# Patient Record
Sex: FEMALE | Race: WHITE | Employment: FULL TIME | URBAN - METROPOLITAN AREA
[De-identification: names, ages, dates, MRNs, and addresses within clinical notes are randomized per-mention and may not be internally consistent; named-entity substitution may affect disease eponyms.]

---

## 2019-07-12 ENCOUNTER — TELEPHONE (OUTPATIENT)
Dept: SURGERY | Facility: CLINIC | Age: 54
End: 2019-07-12

## 2019-07-12 NOTE — TELEPHONE ENCOUNTER
Dr. Kelley Mathias called office to inform that patient is in house at [facility] in [place] with SBO. She is being managed conservatively at the moment but wanted Dr. Brian Rios to be aware.

## 2021-01-27 ENCOUNTER — OFFICE VISIT (OUTPATIENT)
Dept: SURGERY | Facility: CLINIC | Age: 56
End: 2021-01-27
Payer: COMMERCIAL

## 2021-01-27 VITALS
HEIGHT: 64 IN | SYSTOLIC BLOOD PRESSURE: 110 MMHG | HEART RATE: 114 BPM | OXYGEN SATURATION: 97 % | BODY MASS INDEX: 21.24 KG/M2 | WEIGHT: 124.38 LBS | DIASTOLIC BLOOD PRESSURE: 69 MMHG | RESPIRATION RATE: 20 BRPM

## 2021-01-27 DIAGNOSIS — C16.9 MALIGNANT NEOPLASM OF STOMACH, UNSPECIFIED LOCATION (HCC): Primary | ICD-10-CM

## 2021-01-27 DIAGNOSIS — Z01.818 PRE-OP TESTING: ICD-10-CM

## 2021-01-27 DIAGNOSIS — C78.6 PERITONEAL CARCINOMATOSIS (HCC): ICD-10-CM

## 2021-01-27 LAB
ALBUMIN SERPL-MCNC: 3.7 G/DL (ref 3.4–5)
ALBUMIN/GLOB SERPL: 1.1 {RATIO} (ref 1–2)
ALP LIVER SERPL-CCNC: 84 U/L
ALT SERPL-CCNC: 16 U/L
ANION GAP SERPL CALC-SCNC: 7 MMOL/L (ref 0–18)
AST SERPL-CCNC: 17 U/L (ref 15–37)
BASOPHILS # BLD AUTO: 0.02 X10(3) UL (ref 0–0.2)
BASOPHILS NFR BLD AUTO: 0.3 %
BILIRUB SERPL-MCNC: 0.8 MG/DL (ref 0.1–2)
BUN BLD-MCNC: 13 MG/DL (ref 7–18)
BUN/CREAT SERPL: 18.1 (ref 10–20)
CALCIUM BLD-MCNC: 8.6 MG/DL (ref 8.5–10.1)
CANCER AG19-9 SERPL-ACNC: 10 U/ML (ref ?–37)
CEA SERPL-MCNC: 4.4 NG/ML (ref ?–5)
CHLORIDE SERPL-SCNC: 108 MMOL/L (ref 98–112)
CO2 SERPL-SCNC: 24 MMOL/L (ref 21–32)
CREAT BLD-MCNC: 0.72 MG/DL
DEPRECATED RDW RBC AUTO: 41.1 FL (ref 35.1–46.3)
EOSINOPHIL # BLD AUTO: 0.06 X10(3) UL (ref 0–0.7)
EOSINOPHIL NFR BLD AUTO: 0.8 %
ERYTHROCYTE [DISTWIDTH] IN BLOOD BY AUTOMATED COUNT: 12.2 % (ref 11–15)
GLOBULIN PLAS-MCNC: 3.5 G/DL (ref 2.8–4.4)
GLUCOSE BLD-MCNC: 89 MG/DL (ref 70–99)
HCT VFR BLD AUTO: 36 %
HGB BLD-MCNC: 11.7 G/DL
IMM GRANULOCYTES # BLD AUTO: 0.02 X10(3) UL (ref 0–1)
IMM GRANULOCYTES NFR BLD: 0.3 %
LYMPHOCYTES # BLD AUTO: 1.11 X10(3) UL (ref 1–4)
LYMPHOCYTES NFR BLD AUTO: 13.9 %
M PROTEIN MFR SERPL ELPH: 7.2 G/DL (ref 6.4–8.2)
MCH RBC QN AUTO: 29.8 PG (ref 26–34)
MCHC RBC AUTO-ENTMCNC: 32.5 G/DL (ref 31–37)
MCV RBC AUTO: 91.8 FL
MONOCYTES # BLD AUTO: 0.64 X10(3) UL (ref 0.1–1)
MONOCYTES NFR BLD AUTO: 8 %
NEUTROPHILS # BLD AUTO: 6.12 X10 (3) UL (ref 1.5–7.7)
NEUTROPHILS # BLD AUTO: 6.12 X10(3) UL (ref 1.5–7.7)
NEUTROPHILS NFR BLD AUTO: 76.7 %
OSMOLALITY SERPL CALC.SUM OF ELEC: 288 MOSM/KG (ref 275–295)
PATIENT FASTING Y/N/NP: NO
PLATELET # BLD AUTO: 107 10(3)UL (ref 150–450)
POTASSIUM SERPL-SCNC: 4 MMOL/L (ref 3.5–5.1)
RBC # BLD AUTO: 3.92 X10(6)UL
SODIUM SERPL-SCNC: 139 MMOL/L (ref 136–145)
WBC # BLD AUTO: 8 X10(3) UL (ref 4–11)

## 2021-01-27 PROCEDURE — 82378 CARCINOEMBRYONIC ANTIGEN: CPT | Performed by: SURGERY

## 2021-01-27 PROCEDURE — 85025 COMPLETE CBC W/AUTO DIFF WBC: CPT | Performed by: SURGERY

## 2021-01-27 PROCEDURE — 86304 IMMUNOASSAY TUMOR CA 125: CPT | Performed by: SURGERY

## 2021-01-27 PROCEDURE — 99215 OFFICE O/P EST HI 40 MIN: CPT | Performed by: SURGERY

## 2021-01-27 PROCEDURE — 3008F BODY MASS INDEX DOCD: CPT | Performed by: SURGERY

## 2021-01-27 PROCEDURE — 86301 IMMUNOASSAY TUMOR CA 19-9: CPT | Performed by: SURGERY

## 2021-01-27 PROCEDURE — 3078F DIAST BP <80 MM HG: CPT | Performed by: SURGERY

## 2021-01-27 PROCEDURE — 3074F SYST BP LT 130 MM HG: CPT | Performed by: SURGERY

## 2021-01-27 PROCEDURE — 80053 COMPREHEN METABOLIC PANEL: CPT | Performed by: SURGERY

## 2021-01-27 NOTE — CONSULTS
[address] [facility] Surgical Oncology        Patient Name:  Pilar Lindquist   YOB: 1965   Gender:  Female   Appt Date:  1/27/2021   Provider:  Gurmeet Montoya MD     PATIENT PROVIDERS  Referring Provider: Janusz Campos MD    Primary Care Provider: Liv Palacios •  Fluticasone Propionate (FLONASE) 50 MCG/ACT Nasal Suspension, 2 sprays by Each Nare route as needed.   , Disp: , Rfl:      Allergies Reviewed:    Penicillins             UNKNOWN  Sulfa Antibiotics       NAUSEA AND VOMITING     History:  Reviewed:  Past M Neck: Neck: supple. Lymph Nodes: Lymph Nodes no cervical LAD, supraclavicular LAD, axillary LAD, or inguinal LAD. Lungs: Auscultation: breath sounds normal.   Cardiovascular: Heart Auscultation: RRR.    Abdomen: Inspection and Palpation: no masses, tend Patient had ample time to ask questions. Electronically Signed by:     Sharla Caballero.  Sabrina Ochoa MD FACS  [facility]  Professor of Surgery, [address] [facility]  [phone]

## 2021-01-27 NOTE — H&P (VIEW-ONLY)
[facility] Surgical On[facility]        Patient Name:  Marissa Hubbard   YOB: 1965   Gender:  Female   Appt Date:  1/27/2021   Provider:  Jocelyn Whitman MD     PATIENT PROVIDERS  Referring Provider: Simin William MD    Primary Care Provider: Ann-Marie Han Fluticasone Propionate (FLONASE) 50 MCG/ACT Nasal Suspension, 2 sprays by Each Nare route as needed.   , Disp: , Rfl:      Allergies Reviewed:    Penicillins             UNKNOWN  Sulfa Antibiotics       NAUSEA AND VOMITING     History:  Reviewed:  Past Medi Neck: supple. Lymph Nodes: Lymph Nodes no cervical LAD, supraclavicular LAD, axillary LAD, or inguinal LAD. Lungs: Auscultation: breath sounds normal.   Cardiovascular: Heart Auscultation: RRR.    Abdomen: Inspection and Palpation: no masses, tenderness to ask questions. Electronically Signed by:     Colten Mcclure MD FACS  Chief of Ruddy Uribe  Professor of Surgery, [address]  [phone]

## 2021-01-27 NOTE — PATIENT INSTRUCTIONS
Surgery:  Exploratory laparotomy, possible Cytoreductive surgery, possible multi-organ resection, hyperthermic intraperitoneal chemotherapy (HIPEC), possible jejunostomy tube    Date of Surgery:  2/12/21    Hospital:    [address] contact the prescribing provider for specific instructions on holding these medications for your procedure. · Inform your primary care physician of your surgery and ask if him/her will need to see you prior to surgery.   · If you develop symptoms of anothe

## 2021-01-28 DIAGNOSIS — C16.9 MALIGNANT NEOPLASM OF STOMACH, UNSPECIFIED LOCATION (HCC): Primary | ICD-10-CM

## 2021-01-28 DIAGNOSIS — C78.6 PERITONEAL CARCINOMATOSIS (HCC): ICD-10-CM

## 2021-01-28 LAB — CANCER AG125 SERPL-ACNC: 3.6 U/ML (ref ?–35)

## 2021-01-29 ENCOUNTER — TELEPHONE (OUTPATIENT)
Dept: HEMATOLOGY/ONCOLOGY | Facility: HOSPITAL | Age: 56
End: 2021-01-29

## 2021-01-29 RX ORDER — BUSPIRONE HYDROCHLORIDE 15 MG/1
7.5 TABLET ORAL 2 TIMES DAILY
Status: ON HOLD | COMMUNITY
End: 2021-11-15

## 2021-01-29 RX ORDER — ACETAMINOPHEN 500 MG
1000 TABLET ORAL ONCE
Status: CANCELLED | OUTPATIENT
Start: 2021-01-29 | End: 2021-01-29

## 2021-01-29 RX ORDER — FIBER
1 TABLET ORAL DAILY
COMMUNITY

## 2021-01-29 RX ORDER — FAMOTIDINE 20 MG/1
20 TABLET ORAL 2 TIMES DAILY
COMMUNITY

## 2021-02-11 ENCOUNTER — ANESTHESIA EVENT (OUTPATIENT)
Dept: SURGERY | Facility: HOSPITAL | Age: 56
DRG: 336 | End: 2021-02-11
Payer: COMMERCIAL

## 2021-02-12 ENCOUNTER — ANESTHESIA (OUTPATIENT)
Dept: SURGERY | Facility: HOSPITAL | Age: 56
DRG: 336 | End: 2021-02-12
Payer: COMMERCIAL

## 2021-02-12 ENCOUNTER — HOSPITAL ENCOUNTER (INPATIENT)
Facility: HOSPITAL | Age: 56
LOS: 5 days | Discharge: HOME HEALTH | DRG: 336 | End: 2021-02-17
Attending: SURGERY | Admitting: SURGERY
Payer: COMMERCIAL

## 2021-02-12 DIAGNOSIS — C78.6 PERITONEAL CARCINOMATOSIS (HCC): ICD-10-CM

## 2021-02-12 DIAGNOSIS — C16.9 MALIGNANT NEOPLASM OF STOMACH, UNSPECIFIED LOCATION (HCC): ICD-10-CM

## 2021-02-12 LAB
ANTIBODY SCREEN: NEGATIVE
GLUCOSE BLD-MCNC: 108 MG/DL (ref 70–99)
ISTAT BLOOD GAS BASE EXCESS: 1 MMOL/L (ref ?–30)
ISTAT BLOOD GAS HCO3: 25.8 MEQ/L (ref 22–26)
ISTAT BLOOD GAS O2 SATURATION: 100 % (ref 92–100)
ISTAT BLOOD GAS PCO2: 39.2 MMHG (ref 35–45)
ISTAT BLOOD GAS PH: 7.43 (ref 7.35–7.45)
ISTAT BLOOD GAS PO2: >400 MMHG (ref 80–105)
ISTAT BLOOD GAS TCO2: 27 MMOL/L (ref 22–32)
ISTAT HEMATOCRIT: 32 %
ISTAT IONIZED CALCIUM: 1.09 MMOL/L (ref 1.12–1.32)
ISTAT POTASSIUM: 3.9 MMOL/L (ref 3.6–5.1)
ISTAT SODIUM: 141 MMOL/L (ref 136–145)
RH BLOOD TYPE: POSITIVE

## 2021-02-12 PROCEDURE — 99252 IP/OBS CONSLTJ NEW/EST SF 35: CPT | Performed by: HOSPITALIST

## 2021-02-12 PROCEDURE — 3074F SYST BP LT 130 MM HG: CPT | Performed by: HOSPITALIST

## 2021-02-12 PROCEDURE — 3008F BODY MASS INDEX DOCD: CPT | Performed by: HOSPITALIST

## 2021-02-12 PROCEDURE — 0DNW0ZZ RELEASE PERITONEUM, OPEN APPROACH: ICD-10-PCS | Performed by: SURGERY

## 2021-02-12 PROCEDURE — 3078F DIAST BP <80 MM HG: CPT | Performed by: HOSPITALIST

## 2021-02-12 PROCEDURE — 76942 ECHO GUIDE FOR BIOPSY: CPT | Performed by: ANESTHESIOLOGY

## 2021-02-12 PROCEDURE — 05H533Z INSERTION OF INFUSION DEVICE INTO RIGHT SUBCLAVIAN VEIN, PERCUTANEOUS APPROACH: ICD-10-PCS | Performed by: SURGERY

## 2021-02-12 PROCEDURE — B546ZZA ULTRASONOGRAPHY OF RIGHT SUBCLAVIAN VEIN, GUIDANCE: ICD-10-PCS | Performed by: SURGERY

## 2021-02-12 PROCEDURE — 0DHA3UZ INSERTION OF FEEDING DEVICE INTO JEJUNUM, PERCUTANEOUS APPROACH: ICD-10-PCS | Performed by: SURGERY

## 2021-02-12 PROCEDURE — 0DJ08ZZ INSPECTION OF UPPER INTESTINAL TRACT, VIA NATURAL OR ARTIFICIAL OPENING ENDOSCOPIC: ICD-10-PCS | Performed by: INTERNAL MEDICINE

## 2021-02-12 DEVICE — BALLOON MIC JENUNAL 12FR: Type: IMPLANTABLE DEVICE | Site: ABDOMEN | Status: FUNCTIONAL

## 2021-02-12 RX ORDER — SODIUM CHLORIDE, SODIUM LACTATE, POTASSIUM CHLORIDE, CALCIUM CHLORIDE 600; 310; 30; 20 MG/100ML; MG/100ML; MG/100ML; MG/100ML
INJECTION, SOLUTION INTRAVENOUS CONTINUOUS
Status: DISCONTINUED | OUTPATIENT
Start: 2021-02-12 | End: 2021-02-13

## 2021-02-12 RX ORDER — FAMOTIDINE 10 MG/ML
20 INJECTION, SOLUTION INTRAVENOUS 2 TIMES DAILY
Status: DISCONTINUED | OUTPATIENT
Start: 2021-02-12 | End: 2021-02-17

## 2021-02-12 RX ORDER — MIDAZOLAM HYDROCHLORIDE 1 MG/ML
INJECTION INTRAMUSCULAR; INTRAVENOUS AS NEEDED
Status: DISCONTINUED | OUTPATIENT
Start: 2021-02-12 | End: 2021-02-12 | Stop reason: SURG

## 2021-02-12 RX ORDER — NEOSTIGMINE METHYLSULFATE 1 MG/ML
INJECTION INTRAVENOUS AS NEEDED
Status: DISCONTINUED | OUTPATIENT
Start: 2021-02-12 | End: 2021-02-12 | Stop reason: SURG

## 2021-02-12 RX ORDER — MEPERIDINE HYDROCHLORIDE 25 MG/ML
INJECTION INTRAMUSCULAR; INTRAVENOUS; SUBCUTANEOUS
Status: COMPLETED
Start: 2021-02-12 | End: 2021-02-12

## 2021-02-12 RX ORDER — SODIUM CHLORIDE, SODIUM LACTATE, POTASSIUM CHLORIDE, CALCIUM CHLORIDE 600; 310; 30; 20 MG/100ML; MG/100ML; MG/100ML; MG/100ML
INJECTION, SOLUTION INTRAVENOUS CONTINUOUS
Status: DISCONTINUED | OUTPATIENT
Start: 2021-02-12 | End: 2021-02-12 | Stop reason: HOSPADM

## 2021-02-12 RX ORDER — LABETALOL HYDROCHLORIDE 5 MG/ML
5 INJECTION, SOLUTION INTRAVENOUS EVERY 5 MIN PRN
Status: DISCONTINUED | OUTPATIENT
Start: 2021-02-12 | End: 2021-02-12 | Stop reason: HOSPADM

## 2021-02-12 RX ORDER — MIDAZOLAM HYDROCHLORIDE 1 MG/ML
1 INJECTION INTRAMUSCULAR; INTRAVENOUS EVERY 5 MIN PRN
Status: DISCONTINUED | OUTPATIENT
Start: 2021-02-12 | End: 2021-02-12 | Stop reason: HOSPADM

## 2021-02-12 RX ORDER — SODIUM CHLORIDE, SODIUM LACTATE, POTASSIUM CHLORIDE, CALCIUM CHLORIDE 600; 310; 30; 20 MG/100ML; MG/100ML; MG/100ML; MG/100ML
INJECTION, SOLUTION INTRAVENOUS CONTINUOUS PRN
Status: DISCONTINUED | OUTPATIENT
Start: 2021-02-12 | End: 2021-02-12 | Stop reason: SURG

## 2021-02-12 RX ORDER — SODIUM CHLORIDE 9 MG/ML
INJECTION, SOLUTION INTRAVENOUS CONTINUOUS
Status: DISCONTINUED | OUTPATIENT
Start: 2021-02-12 | End: 2021-02-14

## 2021-02-12 RX ORDER — ACETAMINOPHEN 10 MG/ML
1000 INJECTION, SOLUTION INTRAVENOUS EVERY 6 HOURS
Status: DISCONTINUED | OUTPATIENT
Start: 2021-02-12 | End: 2021-02-15

## 2021-02-12 RX ORDER — FAMOTIDINE 20 MG/1
20 TABLET ORAL 2 TIMES DAILY
Status: DISCONTINUED | OUTPATIENT
Start: 2021-02-12 | End: 2021-02-17

## 2021-02-12 RX ORDER — ENOXAPARIN SODIUM 100 MG/ML
40 INJECTION SUBCUTANEOUS DAILY
Status: DISCONTINUED | OUTPATIENT
Start: 2021-02-13 | End: 2021-02-17

## 2021-02-12 RX ORDER — NALOXONE HYDROCHLORIDE 0.4 MG/ML
80 INJECTION, SOLUTION INTRAMUSCULAR; INTRAVENOUS; SUBCUTANEOUS AS NEEDED
Status: DISCONTINUED | OUTPATIENT
Start: 2021-02-12 | End: 2021-02-12 | Stop reason: HOSPADM

## 2021-02-12 RX ORDER — GLYCOPYRROLATE 0.2 MG/ML
INJECTION, SOLUTION INTRAMUSCULAR; INTRAVENOUS AS NEEDED
Status: DISCONTINUED | OUTPATIENT
Start: 2021-02-12 | End: 2021-02-12 | Stop reason: SURG

## 2021-02-12 RX ORDER — ONDANSETRON 2 MG/ML
INJECTION INTRAMUSCULAR; INTRAVENOUS AS NEEDED
Status: DISCONTINUED | OUTPATIENT
Start: 2021-02-12 | End: 2021-02-12 | Stop reason: SURG

## 2021-02-12 RX ORDER — HEPARIN SODIUM 5000 [USP'U]/ML
5000 INJECTION, SOLUTION INTRAVENOUS; SUBCUTANEOUS ONCE
Status: COMPLETED | OUTPATIENT
Start: 2021-02-12 | End: 2021-02-12

## 2021-02-12 RX ORDER — LIDOCAINE HYDROCHLORIDE ANHYDROUS AND DEXTROSE MONOHYDRATE .8; 5 G/100ML; G/100ML
INJECTION, SOLUTION INTRAVENOUS CONTINUOUS PRN
Status: DISCONTINUED | OUTPATIENT
Start: 2021-02-12 | End: 2021-02-12 | Stop reason: SURG

## 2021-02-12 RX ORDER — ONDANSETRON 2 MG/ML
4 INJECTION INTRAMUSCULAR; INTRAVENOUS EVERY 6 HOURS PRN
Status: DISCONTINUED | OUTPATIENT
Start: 2021-02-12 | End: 2021-02-17

## 2021-02-12 RX ORDER — DEXAMETHASONE SODIUM PHOSPHATE 4 MG/ML
VIAL (ML) INJECTION AS NEEDED
Status: DISCONTINUED | OUTPATIENT
Start: 2021-02-12 | End: 2021-02-12 | Stop reason: SURG

## 2021-02-12 RX ORDER — LIDOCAINE HYDROCHLORIDE 10 MG/ML
INJECTION, SOLUTION EPIDURAL; INFILTRATION; INTRACAUDAL; PERINEURAL AS NEEDED
Status: DISCONTINUED | OUTPATIENT
Start: 2021-02-12 | End: 2021-02-12 | Stop reason: SURG

## 2021-02-12 RX ORDER — ONDANSETRON 2 MG/ML
4 INJECTION INTRAMUSCULAR; INTRAVENOUS AS NEEDED
Status: DISCONTINUED | OUTPATIENT
Start: 2021-02-12 | End: 2021-02-12 | Stop reason: HOSPADM

## 2021-02-12 RX ORDER — HYDROCODONE BITARTRATE AND ACETAMINOPHEN 5; 325 MG/1; MG/1
2 TABLET ORAL AS NEEDED
Status: DISCONTINUED | OUTPATIENT
Start: 2021-02-12 | End: 2021-02-12 | Stop reason: HOSPADM

## 2021-02-12 RX ORDER — KETOROLAC TROMETHAMINE 30 MG/ML
INJECTION, SOLUTION INTRAMUSCULAR; INTRAVENOUS AS NEEDED
Status: DISCONTINUED | OUTPATIENT
Start: 2021-02-12 | End: 2021-02-12 | Stop reason: SURG

## 2021-02-12 RX ORDER — HYDROCODONE BITARTRATE AND ACETAMINOPHEN 5; 325 MG/1; MG/1
1 TABLET ORAL AS NEEDED
Status: DISCONTINUED | OUTPATIENT
Start: 2021-02-12 | End: 2021-02-12 | Stop reason: HOSPADM

## 2021-02-12 RX ORDER — METRONIDAZOLE 500 MG/100ML
500 INJECTION, SOLUTION INTRAVENOUS ONCE
Status: COMPLETED | OUTPATIENT
Start: 2021-02-12 | End: 2021-02-12

## 2021-02-12 RX ORDER — HYDROMORPHONE HYDROCHLORIDE 1 MG/ML
0.4 INJECTION, SOLUTION INTRAMUSCULAR; INTRAVENOUS; SUBCUTANEOUS EVERY 5 MIN PRN
Status: DISCONTINUED | OUTPATIENT
Start: 2021-02-12 | End: 2021-02-12 | Stop reason: HOSPADM

## 2021-02-12 RX ORDER — HYDROMORPHONE HYDROCHLORIDE 1 MG/ML
INJECTION, SOLUTION INTRAMUSCULAR; INTRAVENOUS; SUBCUTANEOUS
Status: COMPLETED
Start: 2021-02-12 | End: 2021-02-12

## 2021-02-12 RX ORDER — ROCURONIUM BROMIDE 10 MG/ML
INJECTION, SOLUTION INTRAVENOUS AS NEEDED
Status: DISCONTINUED | OUTPATIENT
Start: 2021-02-12 | End: 2021-02-12 | Stop reason: SURG

## 2021-02-12 RX ORDER — MEPERIDINE HYDROCHLORIDE 25 MG/ML
12.5 INJECTION INTRAMUSCULAR; INTRAVENOUS; SUBCUTANEOUS AS NEEDED
Status: DISCONTINUED | OUTPATIENT
Start: 2021-02-12 | End: 2021-02-12 | Stop reason: HOSPADM

## 2021-02-12 RX ADMIN — MIDAZOLAM HYDROCHLORIDE 2 MG: 1 INJECTION INTRAMUSCULAR; INTRAVENOUS at 07:43:00

## 2021-02-12 RX ADMIN — SODIUM CHLORIDE, SODIUM LACTATE, POTASSIUM CHLORIDE, CALCIUM CHLORIDE: 600; 310; 30; 20 INJECTION, SOLUTION INTRAVENOUS at 11:23:00

## 2021-02-12 RX ADMIN — METRONIDAZOLE 500 MG: 500 INJECTION, SOLUTION INTRAVENOUS at 08:00:00

## 2021-02-12 RX ADMIN — LIDOCAINE HYDROCHLORIDE ANHYDROUS AND DEXTROSE MONOHYDRATE 2 MG/KG/HR: .8; 5 INJECTION, SOLUTION INTRAVENOUS at 07:40:00

## 2021-02-12 RX ADMIN — ONDANSETRON 4 MG: 2 INJECTION INTRAMUSCULAR; INTRAVENOUS at 10:49:00

## 2021-02-12 RX ADMIN — GLYCOPYRROLATE 0.6 MG: 0.2 INJECTION, SOLUTION INTRAMUSCULAR; INTRAVENOUS at 11:05:00

## 2021-02-12 RX ADMIN — SODIUM CHLORIDE, SODIUM LACTATE, POTASSIUM CHLORIDE, CALCIUM CHLORIDE: 600; 310; 30; 20 INJECTION, SOLUTION INTRAVENOUS at 09:40:00

## 2021-02-12 RX ADMIN — NEOSTIGMINE METHYLSULFATE 5 MG: 1 INJECTION INTRAVENOUS at 11:05:00

## 2021-02-12 RX ADMIN — ROCURONIUM BROMIDE 70 MG: 10 INJECTION, SOLUTION INTRAVENOUS at 07:50:00

## 2021-02-12 RX ADMIN — DEXAMETHASONE SODIUM PHOSPHATE 8 MG: 4 MG/ML VIAL (ML) INJECTION at 07:50:00

## 2021-02-12 RX ADMIN — LIDOCAINE HYDROCHLORIDE 50 MG: 10 INJECTION, SOLUTION EPIDURAL; INFILTRATION; INTRACAUDAL; PERINEURAL at 07:43:00

## 2021-02-12 RX ADMIN — SODIUM CHLORIDE, SODIUM LACTATE, POTASSIUM CHLORIDE, CALCIUM CHLORIDE: 600; 310; 30; 20 INJECTION, SOLUTION INTRAVENOUS at 07:37:00

## 2021-02-12 RX ADMIN — KETOROLAC TROMETHAMINE 30 MG: 30 INJECTION, SOLUTION INTRAMUSCULAR; INTRAVENOUS at 10:49:00

## 2021-02-12 NOTE — CONSULTS
[facility]                       Gastroenterology Consultation-[facility]    Vladimir Reveles Patient Status:  Inpatient    3/5/1965 MRN [id number]   [facility] 7NE-A Attending Reid Peoples MD   [facility] Intravenous, Continuous    •  [COMPLETED] Heparin Sodium (Porcine) 5000 UNIT/ML injection 5,000 Units, 5,000 Units, Subcutaneous, Once    •  [COMPLETED] metRONIDAZOLE in NaCl (FLAGYL) IVPB premix 500 mg, 500 mg, Intravenous, Once    •  ketamine (KETALAR) 2 disorders            Rheumatologic: The patient reports no history of chronic arthritis, myalgias, arthralgias            Genitourinary:  The patient reports no history of recurrent urinary tract infections, hematuria, dysuria, or nephrolithiasis -Patient with external compression of jejunum from recurrent peritoneal mets visualized on upper endoscopy during ex lap today  -Discussed with Dr. Hina Condon and Dr. Samia Mendosa, will plan for possible jejunal stenting in the near future; possibly Sunday vers

## 2021-02-12 NOTE — ANESTHESIA PROCEDURE NOTES
Peripheral IV  Inserted by: Sade Valadez MD    Placement  Needle gauge: 4Fr Powerwand   Laterality: left  Location: antecubital  Local anesthetic: none  Site prep: alcohol  Placement technique: 20g PIV placed x 1.  Changed over guidewire to Powerwand zachariah

## 2021-02-12 NOTE — CONSULTS
[facility]  CONSULT     Marston Carrel Patient Status:  Inpatient    3/5/1965 MRN [id number]   [facility] 7NE-A Attending Burgess Merissa MD   Hosp Day # 0 PCP Eneida Schwab MD     Reason for consult: Post-op    Requested drugs.     Family History:   Family History   Problem Relation Age of Onset   • Cancer Neg         Allergies:   Metoclopramide          OTHER (SEE COMMENTS)    Comment:agitation  Penicillins             UNKNOWN    Comment:As a child  Sulfa Antibiotics cyanosis. Integument: No rashes or lesions. Psychiatric: Appropriate mood and affect. Diagnostic Data:      Labs:  No results for input(s): WBC, HGB, MCV, PLT, BAND, INR in the last 168 hours.     Invalid input(s): LYM#, MONO#, BASOS#, EOSIN#    No

## 2021-02-12 NOTE — ANESTHESIA PREPROCEDURE EVALUATION
PRE-OP EVALUATION    Patient Name: Bladimir Jean Marie    Pre-op Diagnosis: Malignant neoplasm of stomach, unspecified location [facility]) [C16.9]  Peritoneal carcinomatosis ([facility] [address] [C78.6, C80.1]    Procedure(s):  Exploratory laparotomy, possible Cytoreductive (HIPEC) N/A 8/12/2016    Performed by Shalonda Pelaez MD at [facility] MAIN OR   • PORT, INDWELLING, IMP     • UPPER GI ENDOSCOPY,EXAM      x 3     Social History    Tobacco Use      Smoking status: Never Smoker      Smokeless tobacco: Never Used    Alcohol use: No

## 2021-02-12 NOTE — PLAN OF CARE
Assumed care @1330  VSS,   A&Ox4,   1L O2    NSR ,   Pain controlled with Dilaudid PCA, Ketamine gtt, scheduled tylenol. Bedrest.    Poor appetite. Urine output within parameters    Abdominal Incision c/d/I with dressings and abd binder in place.   J tube Maintains adequate nutritional intake (undernourished)  Description: INTERVENTIONS:  - Monitor percentage of each meal consumed  - Identify factors contributing to decreased intake, treat as appropriate  - Assist with meals as needed  - Monitor I&O, WT and

## 2021-02-12 NOTE — ANESTHESIA PROCEDURE NOTES
Airway  Urgency: elective      General Information and Staff    Patient location during procedure: OR  Anesthesiologist: Konstantin Hoskins MD  Performed: anesthesiologist     Indications and Patient Condition  Indications for airway management: anesthesia  Sed

## 2021-02-12 NOTE — CM/SW NOTE
Pt is a 64 yo female admitted for gastric cancer. Pt has surgery with Dr Nguyen Roles today. Pt lives near [place], [place]. She lives with her 3 children who are triplets - 2 boys and a girl. Pt was independent for her adls before the surgery.   Pt will need H

## 2021-02-12 NOTE — ANESTHESIA PROCEDURE NOTES
Arterial Line  Performed by: Xena Agudelo MD  Authorized by: Xena Agudelo MD     General Information and Staff    Procedure Start:   Anesthesiologist: Xena Agudelo MD  Performed By:  Anesthesiologist  Patient Location:  OR  Indication: continuous bloo

## 2021-02-12 NOTE — BRIEF OP NOTE
Pre-Operative Diagnosis: Malignant neoplasm of stomach, unspecified location ([facility] [address] [C16.9]  Peritoneal carcinomatosis ([facility] [address] [C78.6, C80.1]     Post-Operative Diagnosis: Malignant neoplasm of stomach, unspecified location ([facility] [address] [C16. 9]Peritoneal carcinomato

## 2021-02-12 NOTE — OPERATIVE REPORT
Operative Report-Esophagogastroduodenoscopy      PREOPERATIVE DIAGNOSIS/INDICATION: gastric cancer    POSTOPERTATIVE DIAGNOSIS: External compression of jejunal loop     PROCEDURE PERFORMED: EGD    INFORMED CONSENT: Once a brief his Kristen  2/12/2021  10:35 AM

## 2021-02-12 NOTE — ANESTHESIA PROCEDURE NOTES
Regional Block  Performed by: Ryland Simmons MD  Authorized by: Ryland Simmons MD       General Information and Staff    Start Time:   Anesthesiologist: Ryland Simmons MD  Performed by:   Anesthesiologist  Patient Location:  OR    Block Placement: Clau Velasquez

## 2021-02-12 NOTE — INTERVAL H&P NOTE
There has been no significant change since the patient was seen as documented in EPIC. EGD report reviewed. There was extrinsic compression just distal to the gastrojejunostomy anastomosis. Surgery revisted. To proceed as planned.       Radha Edwards MP

## 2021-02-12 NOTE — PROGRESS NOTES
02/12/21 1634   Clinical Encounter Type   Visited With Patient   Routine Visit Introduction   Continue Visiting No  (upon request or as needed)   Patient Spiritual Encounters   Spiritual Needs Patient presents spiritual strength   Spiritual Intervention

## 2021-02-12 NOTE — DIETARY NOTE
[facility]    NUTRITION ASSESSMENT    Pt does not meet malnutrition criteria. NUTRITION DIAGNOSIS/PROBLEM:    Altered GI function related to changes to GI tract function as evidenced by obstruction to esophagus    NUTRITION INTERVENTION:       1.  [place] SURENDRA per visual exam.    NUTRITION PRESCRIPTION:  Calories: 1841-2345 calories/day (30-35 calories per kg)  Protein: 66-83 grams protein/day (1.2-1.5 grams protein per kg)  Fluid: ~1 ml/kcal or per MD discretion    MONITOR AND EVALUATE/NUTRITION GOALS:    3

## 2021-02-13 LAB
ALBUMIN SERPL-MCNC: 2.5 G/DL (ref 3.4–5)
ALBUMIN/GLOB SERPL: 1 {RATIO} (ref 1–2)
ALP LIVER SERPL-CCNC: 59 U/L
ALT SERPL-CCNC: 37 U/L
ANION GAP SERPL CALC-SCNC: 4 MMOL/L (ref 0–18)
AST SERPL-CCNC: 54 U/L (ref 15–37)
BILIRUB SERPL-MCNC: 0.4 MG/DL (ref 0.1–2)
BUN BLD-MCNC: 12 MG/DL (ref 7–18)
BUN/CREAT SERPL: 18.8 (ref 10–20)
CALCIUM BLD-MCNC: 7.9 MG/DL (ref 8.5–10.1)
CHLORIDE SERPL-SCNC: 111 MMOL/L (ref 98–112)
CO2 SERPL-SCNC: 27 MMOL/L (ref 21–32)
CREAT BLD-MCNC: 0.64 MG/DL
DEPRECATED HBV CORE AB SER IA-ACNC: 123.6 NG/ML
DEPRECATED RDW RBC AUTO: 42.7 FL (ref 35.1–46.3)
ERYTHROCYTE [DISTWIDTH] IN BLOOD BY AUTOMATED COUNT: 12.5 % (ref 11–15)
GLOBULIN PLAS-MCNC: 2.6 G/DL (ref 2.8–4.4)
GLUCOSE BLD-MCNC: 102 MG/DL (ref 70–99)
GLUCOSE BLD-MCNC: 112 MG/DL (ref 70–99)
GLUCOSE BLD-MCNC: 118 MG/DL (ref 70–99)
GLUCOSE BLD-MCNC: 73 MG/DL (ref 70–99)
HAV IGM SER QL: 1.8 MG/DL (ref 1.6–2.6)
HCT VFR BLD AUTO: 26.9 %
HGB BLD-MCNC: 8.6 G/DL
HGB BLD-MCNC: 9.4 G/DL
IRON SATURATION: 8 %
IRON SERPL-MCNC: 21 UG/DL
M PROTEIN MFR SERPL ELPH: 5.1 G/DL (ref 6.4–8.2)
MCH RBC QN AUTO: 29.9 PG (ref 26–34)
MCHC RBC AUTO-ENTMCNC: 32 G/DL (ref 31–37)
MCV RBC AUTO: 93.4 FL
OSMOLALITY SERPL CALC.SUM OF ELEC: 292 MOSM/KG (ref 275–295)
PHOSPHATE SERPL-MCNC: 4.5 MG/DL (ref 2.5–4.9)
PLATELET # BLD AUTO: 103 10(3)UL (ref 150–450)
POTASSIUM SERPL-SCNC: 4.4 MMOL/L (ref 3.5–5.1)
RBC # BLD AUTO: 2.88 X10(6)UL
SODIUM SERPL-SCNC: 142 MMOL/L (ref 136–145)
TOTAL IRON BINDING CAPACITY: 264 UG/DL (ref 240–450)
TRANSFERRIN SERPL-MCNC: 177 MG/DL (ref 200–360)
WBC # BLD AUTO: 6.4 X10(3) UL (ref 4–11)

## 2021-02-13 PROCEDURE — 99233 SBSQ HOSP IP/OBS HIGH 50: CPT | Performed by: HOSPITALIST

## 2021-02-13 RX ORDER — MAGNESIUM SULFATE HEPTAHYDRATE 40 MG/ML
2 INJECTION, SOLUTION INTRAVENOUS ONCE
Status: COMPLETED | OUTPATIENT
Start: 2021-02-13 | End: 2021-02-13

## 2021-02-13 RX ORDER — DEXTROSE, SODIUM CHLORIDE, AND POTASSIUM CHLORIDE 5; .9; .15 G/100ML; G/100ML; G/100ML
INJECTION INTRAVENOUS CONTINUOUS
Status: DISCONTINUED | OUTPATIENT
Start: 2021-02-13 | End: 2021-02-15

## 2021-02-13 NOTE — OPERATIVE REPORT
[address]    PATIENT'S NAME: Will RODRIGUES   ATTENDING PHYSICIAN: Sharla Caballero. Sabrina Ochoa MD   OPERATING PHYSICIAN: hSarla Caballero.  Sabrina Ochoa MD   PATIENT ACCOUNT#:   [id number]    LOCATION:  [address] [place] [address]  MEDICAL RECORD #:   [id number]       DATE OF BIR midline incision extending to below the umbilicus was made and deepened. The fascia was incised. The peritoneal cavity was entered.   I encountered extensive adhesion from prior cytoreductive surgery that required a good portion of the operative time to l dressings were then applied. The patient tolerated the procedure well without immediate complications. She was extubated in the operating room and was sent in stable condition to recovery room. Counts were correct.   I was present throughout the procedur

## 2021-02-13 NOTE — PHYSICAL THERAPY NOTE
PHYSICAL THERAPY QUICK EVALUATION - INPATIENT    Room Number: 0565/9547-E  Evaluation Date: 2/13/2021  Presenting Problem: gastric cancer  Physician Order: PT Eval and Treat     2/12/21  PREOPERATIVE DIAGNOSIS:    1.        History of gastric carcinoma wi to Enter : 2  Railing: No  Stairs to Bedroom: 15  Railing: Yes    Lives With: Family  Drives: Yes  Patient Owned Equipment: None       Prior Level of [place]: per pt reports pt was I with ADL's and amb without assistive device.   Pt lives with her 3 16 CK      FUNCTIONAL ABILITY STATUS  Gait Assessment  Gait Assistance: Supervision  Distance (ft): 150  Assistive Device: Rolling walker;None  Pattern: Shuffle  Stoop/Curb Assistance: Supervision  Comment : pt went up/down 5 steps with one handrail    [employer] and overall evaluation complexity is considered low. PT Discharge Recommendations: Home    PLAN  Patient has been evaluated and presents with no skilled Physical Therapy needs at this time. Patient discharged from Physical Therapy services.   Please re-or

## 2021-02-13 NOTE — PLAN OF CARE
Assumed care @7952  VSS,   A&Ox4,   RA   NSR ,   Pain controlled with Dilaudid PCA, Ketamine gtt, scheduled tylenol. Up with 1 assist to chair. PT recommend home    Increased appetite. Lauren d/c'd. Adequate urine output.        Abdominal Incision c/d/I Evaluate fluid balance  Outcome: Progressing  Goal: Maintains adequate nutritional intake (undernourished)  Description: INTERVENTIONS:  - Monitor percentage of each meal consumed  - Identify factors contributing to decreased intake, treat as appropriate

## 2021-02-13 NOTE — PROGRESS NOTES
[facility]  Progress Note     Isha Fraga Patient Status:  Inpatient    3/5/1965 MRN [id number]   [facility] Attending Enedina Del Valle MD   Hosp Day # 1 PCP Bere Galindo MD     Chief Complaint: Gastric carcinoma 1. Gastric carcinoma with peritoneal carcinomatosis sp HIPEC (2016) sp exlap with DOUGLAS, jejunostomy tube placement; intraoperative ultrasound revealed external compression of jejunum d/t metastatic disease 2/12  1. Diet per surgery > NPO at midnight  2.

## 2021-02-13 NOTE — PROGRESS NOTES
Surgical Oncology Inpatient Progress Note    Subjective:  No acute events overnight. Patient stable and afebrile. On room air.   Urine output adequate      Objective:  Temp:  [97.8 °F (36.6 °C)-99.1 °F (37.3 °C)] 98.1 °F (36.7 °C)  Pulse:  [] 69  [email or web address]

## 2021-02-13 NOTE — PROGRESS NOTES
Assumed care at [address]. Patient A&Ox4. Tele SR, on room air. Pain controlled with current regimen. Midline abd incision with old bloody drainage. Abd binder in place. Jtube clamped. Lauren with adequate output. CLD, denies N/V. Hypoactive BS.  Denies pa

## 2021-02-14 ENCOUNTER — ANESTHESIA EVENT (OUTPATIENT)
Dept: ENDOSCOPY | Facility: HOSPITAL | Age: 56
DRG: 336 | End: 2021-02-14
Payer: COMMERCIAL

## 2021-02-14 ENCOUNTER — ANESTHESIA (OUTPATIENT)
Dept: ENDOSCOPY | Facility: HOSPITAL | Age: 56
DRG: 336 | End: 2021-02-14
Payer: COMMERCIAL

## 2021-02-14 ENCOUNTER — APPOINTMENT (OUTPATIENT)
Dept: GENERAL RADIOLOGY | Facility: HOSPITAL | Age: 56
DRG: 336 | End: 2021-02-14
Attending: SURGERY
Payer: COMMERCIAL

## 2021-02-14 LAB
ANION GAP SERPL CALC-SCNC: 5 MMOL/L (ref 0–18)
BASOPHILS # BLD AUTO: 0.01 X10(3) UL (ref 0–0.2)
BASOPHILS NFR BLD AUTO: 0.2 %
BUN BLD-MCNC: 13 MG/DL (ref 7–18)
BUN/CREAT SERPL: 27.7 (ref 10–20)
CALCIUM BLD-MCNC: 7.7 MG/DL (ref 8.5–10.1)
CHLORIDE SERPL-SCNC: 111 MMOL/L (ref 98–112)
CO2 SERPL-SCNC: 27 MMOL/L (ref 21–32)
CREAT BLD-MCNC: 0.47 MG/DL
DEPRECATED RDW RBC AUTO: 44.2 FL (ref 35.1–46.3)
EOSINOPHIL # BLD AUTO: 0.19 X10(3) UL (ref 0–0.7)
EOSINOPHIL NFR BLD AUTO: 3.2 %
ERYTHROCYTE [DISTWIDTH] IN BLOOD BY AUTOMATED COUNT: 12.7 % (ref 11–15)
GLUCOSE BLD-MCNC: 100 MG/DL (ref 70–99)
GLUCOSE BLD-MCNC: 108 MG/DL (ref 70–99)
GLUCOSE BLD-MCNC: 80 MG/DL (ref 70–99)
GLUCOSE BLD-MCNC: 87 MG/DL (ref 70–99)
HAV IGM SER QL: 2.1 MG/DL (ref 1.6–2.6)
HCT VFR BLD AUTO: 27.6 %
HGB BLD-MCNC: 8.7 G/DL
IMM GRANULOCYTES # BLD AUTO: 0.03 X10(3) UL (ref 0–1)
IMM GRANULOCYTES NFR BLD: 0.5 %
LYMPHOCYTES # BLD AUTO: 0.99 X10(3) UL (ref 1–4)
LYMPHOCYTES NFR BLD AUTO: 16.9 %
MCH RBC QN AUTO: 30 PG (ref 26–34)
MCHC RBC AUTO-ENTMCNC: 31.5 G/DL (ref 31–37)
MCV RBC AUTO: 95.2 FL
MONOCYTES # BLD AUTO: 0.34 X10(3) UL (ref 0.1–1)
MONOCYTES NFR BLD AUTO: 5.8 %
NEUTROPHILS # BLD AUTO: 4.31 X10 (3) UL (ref 1.5–7.7)
NEUTROPHILS # BLD AUTO: 4.31 X10(3) UL (ref 1.5–7.7)
NEUTROPHILS NFR BLD AUTO: 73.4 %
OSMOLALITY SERPL CALC.SUM OF ELEC: 295 MOSM/KG (ref 275–295)
PHOSPHATE SERPL-MCNC: 2.8 MG/DL (ref 2.5–4.9)
PLATELET # BLD AUTO: 93 10(3)UL (ref 150–450)
POTASSIUM SERPL-SCNC: 4.2 MMOL/L (ref 3.5–5.1)
RBC # BLD AUTO: 2.9 X10(6)UL
SODIUM SERPL-SCNC: 143 MMOL/L (ref 136–145)
WBC # BLD AUTO: 5.9 X10(3) UL (ref 4–11)

## 2021-02-14 PROCEDURE — 74328 X-RAY BILE DUCT ENDOSCOPY: CPT | Performed by: SURGERY

## 2021-02-14 PROCEDURE — 0D7A8DZ DILATION OF JEJUNUM WITH INTRALUMINAL DEVICE, VIA NATURAL OR ARTIFICIAL OPENING ENDOSCOPIC: ICD-10-PCS | Performed by: INTERNAL MEDICINE

## 2021-02-14 PROCEDURE — 99232 SBSQ HOSP IP/OBS MODERATE 35: CPT | Performed by: HOSPITALIST

## 2021-02-14 DEVICE — STENT SYSTEM WITH ANCHOR LOCK DELIVERY SYSTEM
Type: IMPLANTABLE DEVICE | Status: FUNCTIONAL
Brand: WALLFLEX™ DUODENAL

## 2021-02-14 RX ORDER — NALOXONE HYDROCHLORIDE 0.4 MG/ML
80 INJECTION, SOLUTION INTRAMUSCULAR; INTRAVENOUS; SUBCUTANEOUS AS NEEDED
Status: DISCONTINUED | OUTPATIENT
Start: 2021-02-14 | End: 2021-02-14 | Stop reason: HOSPADM

## 2021-02-14 RX ORDER — OXYCODONE HCL 5 MG/5 ML
5 SOLUTION, ORAL ORAL EVERY 4 HOURS PRN
Status: DISCONTINUED | OUTPATIENT
Start: 2021-02-14 | End: 2021-02-15

## 2021-02-14 RX ORDER — BUSPIRONE HYDROCHLORIDE 15 MG/1
7.5 TABLET ORAL 2 TIMES DAILY
Status: DISCONTINUED | OUTPATIENT
Start: 2021-02-14 | End: 2021-02-17

## 2021-02-14 RX ORDER — BUSPIRONE HYDROCHLORIDE 15 MG/1
7.5 TABLET ORAL 2 TIMES DAILY
Status: DISCONTINUED | OUTPATIENT
Start: 2021-02-14 | End: 2021-02-14

## 2021-02-14 RX ORDER — SODIUM CHLORIDE, SODIUM LACTATE, POTASSIUM CHLORIDE, CALCIUM CHLORIDE 600; 310; 30; 20 MG/100ML; MG/100ML; MG/100ML; MG/100ML
INJECTION, SOLUTION INTRAVENOUS CONTINUOUS
Status: DISCONTINUED | OUTPATIENT
Start: 2021-02-14 | End: 2021-02-14

## 2021-02-14 RX ORDER — LIDOCAINE HYDROCHLORIDE 10 MG/ML
INJECTION, SOLUTION EPIDURAL; INFILTRATION; INTRACAUDAL; PERINEURAL AS NEEDED
Status: DISCONTINUED | OUTPATIENT
Start: 2021-02-14 | End: 2021-02-14 | Stop reason: SURG

## 2021-02-14 RX ADMIN — LIDOCAINE HYDROCHLORIDE 50 MG: 10 INJECTION, SOLUTION EPIDURAL; INFILTRATION; INTRACAUDAL; PERINEURAL at 09:28:00

## 2021-02-14 NOTE — PROGRESS NOTES
Surgical Oncology Inpatient Progress Note    Subjective:  No acute events overnight. Patient stable and afebrile. Underwent upper endoscopy and stent placement per Dr Stefani Alexis.       Objective:  Temp:  [98 °F (36.7 °C)-98.5 °F (36.9 °C)] 98.5 °F (36.9 °C) team  Resume lovenox  Continue incentive spirometry and pulmonary toilet      Patrice Suarez MD  Complex General Surgical Oncology  [facility]  Pager [phone]  [email or web address]

## 2021-02-14 NOTE — PLAN OF CARE
Assumed care of patient at [address] [place] [address]. Patient is alert and oriented. Reports pain controlled with Ketamine and PCA. No complaints of nausea. Voiding without difficulty.  Ambulates with stand by assist.  Tube feeding stopped at midnight for EGD this am.    0600- hydration with IV or PO as ordered and tolerated  - Nasogastric tube to low intermittent suction as ordered  - Evaluate effectiveness of ordered antiemetic medications  - Provide nonpharmacologic comfort measures as appropriate  - Advance diet as tolerated

## 2021-02-14 NOTE — PROGRESS NOTES
[facility]  Progress Note     Nadyne Cooks Patient Status:  Inpatient    3/5/1965 MRN [id number]   [facility] 7NE-A Attending Jacqui Esparza MD   Hosp Day # 2 PCP Alejandra Ramsey MD     Chief Complaint: Gastric carcinoma famoTIDine  20 mg Intravenous BID   • acetaminophen  1,000 mg Intravenous Q6H       ASSESSMENT / PLAN:     1.  Gastric carcinoma with peritoneal carcinomatosis sp HIPEC (2016) sp exlap with DOUGLAS, jejunostomy tube placement; intraoperative ultrasound revealed

## 2021-02-14 NOTE — ANESTHESIA PREPROCEDURE EVALUATION
PRE-OP EVALUATION    Patient Name: Lonnie Frazier    Pre-op Diagnosis: Bowel obstruction ([facility] [address] [G15.852]    Procedure(s):  ESOPHAGOGASTRODUODENOSCOPY (EGD) WITH STENT    Surgeon(s) and Role:     Jose Rocha MD - Primary    Pre-op vitals rev 2/12/2021 at 0315    •  Multiple Vitamins-Minerals (MULTI-DAY PLUS MINERALS) Oral Tab, Take 1 tablet by mouth daily. , Disp: , Rfl: , 2/11/2021 at 0800    •  Biotin w/ Vitamins C & E (HAIR/SKIN/NAILS OR), Take 1 tablet by mouth daily.   , Disp: , Rfl: , 2/ Date    WBC 5.9 02/14/2021    RBC 2.90 (L) 02/14/2021    HGB 8.7 (L) 02/14/2021    HCT 27.6 (L) 02/14/2021    MCV 95.2 02/14/2021    MCH 30.0 02/14/2021    MCHC 31.5 02/14/2021    RDW 12.7 02/14/2021    PLT 93.0 (L) 02/14/2021     Lab Results   Component V

## 2021-02-14 NOTE — ANESTHESIA POSTPROCEDURE EVALUATION
[facility] Patient Status:  Inpatient   Age/Gender 63year old female MRN [id number]   Location [facility]. Attending Harpal Mukherjee MD   Hosp Day # 2 PCP Екатерина Howell MD       Anesthesia Post-op Note    Proced

## 2021-02-14 NOTE — OPERATIVE REPORT
Foy Babinski Patient Status:  Inpatient    3/5/1965 MRN [id number]   [facility] ENDOSCOPY Attending Onur Rai MD   Date 2021 PCP Germán Hernandez MD     PREOPERATIVE DIAGNOSIS/INDICATION: H/o gastric cancer, post gastrec the proximal small bowel, then through the scope over the guidewire a Deskidea enteral stent uncovered metal stent 22 mm x 6 cm, we deployed the stent bridging the stricture, placement was good, this was conformed fluoroscopically, no complication

## 2021-02-14 NOTE — PROGRESS NOTES
[facility] Gastroenterology     Julia Kerr Patient Status:  Inpatient    3/5/1965 MRN [id number]   [facility] 7NE-A Attending Richard Fiore MD   Hosp Day # 1 PCP Steve Ledesma MD COMMENTS)    Comment:agitation  Penicillins             UNKNOWN    Comment:As a child  Sulfa Antibiotics       NAUSEA AND VOMITING    Imaging:  I have personally reviewed all pertinent available imaging.        ASSESSMENT / PLAN:     Tamy Rand i

## 2021-02-15 PROBLEM — K91.89: Status: ACTIVE | Noted: 2021-02-15

## 2021-02-15 LAB
ALBUMIN SERPL-MCNC: 2.5 G/DL (ref 3.4–5)
ALP LIVER SERPL-CCNC: 91 U/L
ALT SERPL-CCNC: 26 U/L
ANION GAP SERPL CALC-SCNC: 4 MMOL/L (ref 0–18)
AST SERPL-CCNC: 26 U/L (ref 15–37)
BASOPHILS # BLD AUTO: 0.01 X10(3) UL (ref 0–0.2)
BASOPHILS NFR BLD AUTO: 0.2 %
BILIRUB DIRECT SERPL-MCNC: 0.1 MG/DL (ref 0–0.2)
BILIRUB SERPL-MCNC: 0.4 MG/DL (ref 0.1–2)
BUN BLD-MCNC: 9 MG/DL (ref 7–18)
BUN/CREAT SERPL: 16.4 (ref 10–20)
CALCIUM BLD-MCNC: 8.2 MG/DL (ref 8.5–10.1)
CHLORIDE SERPL-SCNC: 111 MMOL/L (ref 98–112)
CO2 SERPL-SCNC: 29 MMOL/L (ref 21–32)
CREAT BLD-MCNC: 0.55 MG/DL
DEPRECATED RDW RBC AUTO: 43.9 FL (ref 35.1–46.3)
EOSINOPHIL # BLD AUTO: 0.28 X10(3) UL (ref 0–0.7)
EOSINOPHIL NFR BLD AUTO: 4.6 %
ERYTHROCYTE [DISTWIDTH] IN BLOOD BY AUTOMATED COUNT: 12.7 % (ref 11–15)
GLUCOSE BLD-MCNC: 118 MG/DL (ref 70–99)
GLUCOSE BLD-MCNC: 119 MG/DL (ref 70–99)
GLUCOSE BLD-MCNC: 134 MG/DL (ref 70–99)
GLUCOSE BLD-MCNC: 136 MG/DL (ref 70–99)
GLUCOSE BLD-MCNC: 90 MG/DL (ref 70–99)
HAV IGM SER QL: 2 MG/DL (ref 1.6–2.6)
HCT VFR BLD AUTO: 27.7 %
HGB BLD-MCNC: 8.6 G/DL
IMM GRANULOCYTES # BLD AUTO: 0.01 X10(3) UL (ref 0–1)
IMM GRANULOCYTES NFR BLD: 0.2 %
LYMPHOCYTES # BLD AUTO: 0.83 X10(3) UL (ref 1–4)
LYMPHOCYTES NFR BLD AUTO: 13.7 %
M PROTEIN MFR SERPL ELPH: 5.4 G/DL (ref 6.4–8.2)
MCH RBC QN AUTO: 29.3 PG (ref 26–34)
MCHC RBC AUTO-ENTMCNC: 31 G/DL (ref 31–37)
MCV RBC AUTO: 94.2 FL
MONOCYTES # BLD AUTO: 0.3 X10(3) UL (ref 0.1–1)
MONOCYTES NFR BLD AUTO: 4.9 %
NEUTROPHILS # BLD AUTO: 4.64 X10 (3) UL (ref 1.5–7.7)
NEUTROPHILS # BLD AUTO: 4.64 X10(3) UL (ref 1.5–7.7)
NEUTROPHILS NFR BLD AUTO: 76.4 %
OSMOLALITY SERPL CALC.SUM OF ELEC: 296 MOSM/KG (ref 275–295)
PHOSPHATE SERPL-MCNC: 2.9 MG/DL (ref 2.5–4.9)
PLATELET # BLD AUTO: 89 10(3)UL (ref 150–450)
POTASSIUM SERPL-SCNC: 4.3 MMOL/L (ref 3.5–5.1)
RBC # BLD AUTO: 2.94 X10(6)UL
SODIUM SERPL-SCNC: 144 MMOL/L (ref 136–145)
WBC # BLD AUTO: 6.1 X10(3) UL (ref 4–11)

## 2021-02-15 PROCEDURE — 99231 SBSQ HOSP IP/OBS SF/LOW 25: CPT | Performed by: HOSPITALIST

## 2021-02-15 RX ORDER — HYDROMORPHONE HYDROCHLORIDE 1 MG/ML
0.2 INJECTION, SOLUTION INTRAMUSCULAR; INTRAVENOUS; SUBCUTANEOUS EVERY 2 HOUR PRN
Status: DISCONTINUED | OUTPATIENT
Start: 2021-02-15 | End: 2021-02-17

## 2021-02-15 RX ORDER — OXYCODONE HCL 5 MG/5 ML
5 SOLUTION, ORAL ORAL EVERY 4 HOURS PRN
Status: DISCONTINUED | OUTPATIENT
Start: 2021-02-15 | End: 2021-02-17

## 2021-02-15 RX ORDER — OXYCODONE HCL 5 MG/5 ML
5 SOLUTION, ORAL ORAL EVERY 6 HOURS PRN
Status: DISCONTINUED | OUTPATIENT
Start: 2021-02-15 | End: 2021-02-15

## 2021-02-15 RX ORDER — TEMAZEPAM 15 MG/1
15 CAPSULE ORAL NIGHTLY PRN
Status: DISCONTINUED | OUTPATIENT
Start: 2021-02-15 | End: 2021-02-17

## 2021-02-15 RX ORDER — ACETAMINOPHEN 160 MG/5ML
1000 SOLUTION ORAL EVERY 6 HOURS
Status: DISCONTINUED | OUTPATIENT
Start: 2021-02-15 | End: 2021-02-17

## 2021-02-15 NOTE — PROGRESS NOTES
Surgical Oncology Inpatient Progress Note    Subjective:  No acute events overnight. Patient stable and afebrile. Underwent upper endoscopy and stent placement per Dr Noy Ball yesterday.   Had some clear emesis with clears yesterday  + flatus   No nausea tube placement and intraoperative ultrasound. Doing well and progressing as anticipated. S/p upper endoscopy and stent placement per Dr. Mariann Millan: Continue clear liquid diet, continue tube feeds (cycle)  Discontinue PCA and Ketamine.  Start Tyl liqu

## 2021-02-15 NOTE — DIETARY NOTE
1230 General acute hospital ASSESSMENT    Pt does not meet malnutrition criteria.     NUTRITION DIAGNOSIS/PROBLEM:    Altered GI function related to changes to GI tract function as evidenced by obstruction to esophagus    NUTRITION INTERVENTION: HISTORY:  Appetite:   Intake: 25% (2/14)  Intake Meeting Needs: No  Food Allergies: No  Cultural/Ethnic/Mormonism Preferences Addresses: Yes    NUTRITION RELATED PHYSICAL FINDINGS:     1. Body Fat/Muscle Mass: SURENDRA per visual exam.     2. Fluid Accumulation

## 2021-02-15 NOTE — PLAN OF CARE
Assumed care 1900  Patient alert and oriented x4  Urine adequate per surg onc protocol  Pain controlled  Nausea with clear emesis x1  Relieved with zofran  Tolerating room air, NSR on tele  + gas -BM  Tube feeds running- tolerating- low residual volumes

## 2021-02-15 NOTE — CM/SW NOTE
MSW sent preliminary referral to [place] for suspected need for tube feedings. MSW spoke with Dietary as well. Orders not yet written, but tube feedings are anticipated.   Awaiting response from [place].  Patient unable to be secured with [facility] and no

## 2021-02-15 NOTE — PLAN OF CARE
Assumed care at 0730  A&Ox4  NSR on tele  C/o mild abdominal pain, relieved with PCA dilaudid and ketamine  Small episode of emesis this AM with clear liquids  TF infusing via J tube, plan to cycle them  lovenox education completed with pt, verbalized unde

## 2021-02-15 NOTE — PLAN OF CARE
Problem: Patient/Family Goals  Goal: Patient/Family Long Term Goal  Description: Patient's Long Term Goal: Return home    Interventions:  - have surgery  - pain control  - See additional Care Plan goals for specific interventions  Outcome: Progressing  G Evaluate fluid balance  Outcome: Progressing  Goal: Maintains adequate nutritional intake (undernourished)  Description: INTERVENTIONS:  - Monitor percentage of each meal consumed  - Identify factors contributing to decreased intake, treat as appropriate

## 2021-02-15 NOTE — PROGRESS NOTES
[facility]  Progress Note     Bianca Saul Patient Status:  Inpatient    3/5/1965 MRN [id number]   [facility] 7NE-A Attending Trang Harrison MD   Hosp Day # 3 PCP Roma Cabral MD     Chief Complaint: Gastric carcinoma Epic.    Medications:   • acetaminophen  1,000 mg Per J Tube Q6H   • busPIRone HCl  7.5 mg Oral BID   • Sertraline HCl  150 mg Oral Daily   • enoxaparin  40 mg Subcutaneous Daily   • famoTIDine  20 mg Oral BID    Or   • famoTIDine  20 mg Intravenous BID

## 2021-02-15 NOTE — PROGRESS NOTES
[address] [facility] Gastroenterology     Moody Kerr Patient Status:  Inpatient    3/5/1965 MRN [id number]   [facility] 7NE-A Attending Nilda Ozuna MD   Hosp Day # 3 PCP Jazzmine Alegre MD No results for input(s): PTP, INR in the last 168 hours. No data recorded    @[email or web address](      Imaging: Imaging data reviewed in Epic.     Medications:   • acetaminophen  1,000 mg Per J Tube Q6H   • busPIRone HCl  7.5 mg Oral BID   • Sertrali

## 2021-02-15 NOTE — PROGRESS NOTES
Assumed pt care @ 0730. EGD with enteral stent placement done today. IVF rate decreased down to 15cc/hr. TF resumed, clear liquid started. Per Dr. Nellie Edward, no meds via J-tube.    Pt not tolerating liquid well, gets nauseated, prn IV Zofran with

## 2021-02-16 ENCOUNTER — APPOINTMENT (OUTPATIENT)
Dept: GENERAL RADIOLOGY | Facility: HOSPITAL | Age: 56
DRG: 336 | End: 2021-02-16
Attending: PHYSICIAN ASSISTANT
Payer: COMMERCIAL

## 2021-02-16 LAB
ANION GAP SERPL CALC-SCNC: 5 MMOL/L (ref 0–18)
BASOPHILS # BLD AUTO: 0.01 X10(3) UL (ref 0–0.2)
BASOPHILS NFR BLD AUTO: 0.2 %
BLOOD TYPE BARCODE: 5100
BUN BLD-MCNC: 11 MG/DL (ref 7–18)
BUN/CREAT SERPL: 17.7 (ref 10–20)
CALCIUM BLD-MCNC: 8.6 MG/DL (ref 8.5–10.1)
CHLORIDE SERPL-SCNC: 109 MMOL/L (ref 98–112)
CO2 SERPL-SCNC: 28 MMOL/L (ref 21–32)
CREAT BLD-MCNC: 0.62 MG/DL
DEPRECATED RDW RBC AUTO: 43 FL (ref 35.1–46.3)
EOSINOPHIL # BLD AUTO: 0.24 X10(3) UL (ref 0–0.7)
EOSINOPHIL NFR BLD AUTO: 4.1 %
ERYTHROCYTE [DISTWIDTH] IN BLOOD BY AUTOMATED COUNT: 13 % (ref 11–15)
GLUCOSE BLD-MCNC: 115 MG/DL (ref 70–99)
GLUCOSE BLD-MCNC: 119 MG/DL (ref 70–99)
GLUCOSE BLD-MCNC: 126 MG/DL (ref 70–99)
GLUCOSE BLD-MCNC: 126 MG/DL (ref 70–99)
GLUCOSE BLD-MCNC: 132 MG/DL (ref 70–99)
HAV IGM SER QL: 2.1 MG/DL (ref 1.6–2.6)
HCT VFR BLD AUTO: 29.2 %
HGB BLD-MCNC: 9.5 G/DL
IMM GRANULOCYTES # BLD AUTO: 0.02 X10(3) UL (ref 0–1)
IMM GRANULOCYTES NFR BLD: 0.3 %
LYMPHOCYTES # BLD AUTO: 0.87 X10(3) UL (ref 1–4)
LYMPHOCYTES NFR BLD AUTO: 14.8 %
MCH RBC QN AUTO: 29.9 PG (ref 26–34)
MCHC RBC AUTO-ENTMCNC: 32.5 G/DL (ref 31–37)
MCV RBC AUTO: 91.8 FL
MONOCYTES # BLD AUTO: 0.45 X10(3) UL (ref 0.1–1)
MONOCYTES NFR BLD AUTO: 7.6 %
NEUTROPHILS # BLD AUTO: 4.3 X10 (3) UL (ref 1.5–7.7)
NEUTROPHILS # BLD AUTO: 4.3 X10(3) UL (ref 1.5–7.7)
NEUTROPHILS NFR BLD AUTO: 73 %
OSMOLALITY SERPL CALC.SUM OF ELEC: 295 MOSM/KG (ref 275–295)
PHOSPHATE SERPL-MCNC: 3.3 MG/DL (ref 2.5–4.9)
PLATELET # BLD AUTO: 116 10(3)UL (ref 150–450)
POTASSIUM SERPL-SCNC: 4.1 MMOL/L (ref 3.5–5.1)
RBC # BLD AUTO: 3.18 X10(6)UL
SODIUM SERPL-SCNC: 142 MMOL/L (ref 136–145)
WBC # BLD AUTO: 5.9 X10(3) UL (ref 4–11)

## 2021-02-16 PROCEDURE — 74240 X-RAY XM UPR GI TRC 1CNTRST: CPT | Performed by: PHYSICIAN ASSISTANT

## 2021-02-16 PROCEDURE — 99231 SBSQ HOSP IP/OBS SF/LOW 25: CPT | Performed by: HOSPITALIST

## 2021-02-16 NOTE — PROGRESS NOTES
Surgical Oncology Inpatient Progress Note    Subjective:  No acute events overnight. Patient stable and afebrile.   Had some clear emesis again yesterday, but tolerated water at night   + flatus, feels like she may have a BM  Pain controlled      Objective anticipated. S/p upper endoscopy and stent placement per Dr. Amando Guaman: Continue clear liquid diet, continue tube feeds (cycle), if tolerates clears may advance to full liquids   Tyl liquid and oxy liquid via j-tube.   Appreciate recommendations from

## 2021-02-16 NOTE — PROGRESS NOTES
[facility]  Progress Note     Tami Clarity Patient Status:  Inpatient    3/5/1965 MRN [id number]   [facility] 7NE-A Attending Oleg Kerr MD   Hosp Day # 4 PCP Dell Salazar MD     Chief Complaint: Gastric carcinoma input(s): PTP, INR in the last 168 hours. No results for input(s): TROP, CK in the last 168 hours. Imaging: Imaging data reviewed in Epic.     Medications:   • acetaminophen  1,000 mg Per J Tube Q6H   • busPIRone HCl  7.5 mg Oral BID   • Sertrali

## 2021-02-16 NOTE — PLAN OF CARE
Assumed care @0730  VSS,   A&Ox4,   RA   NSR ,   Pain controlled with scheduled tylenol. PRN oxy given x1  Up with standby assist.     Moderate urine output. Abdominal Incision c/d/I with dressings and abd binder in place.   Abd slightly distended/ga guidelines  Outcome: Progressing     Problem: GASTROINTESTINAL - ADULT  Goal: Minimal or absence of nausea and vomiting  Description: INTERVENTIONS:  - Maintain adequate hydration with IV or PO as ordered and tolerated  - Nasogastric tube to low intermitte

## 2021-02-16 NOTE — CM/SW NOTE
[facility] from [place] [phone] called to say that her nurse educator Bhavna Vásquez will come to the hospital today to teach pt how to manage her tube feedings at home. He will be here between 3:15-3:30pm.  Pt aware.   Sent enteral feeding order form to JOSE LUIS Asher

## 2021-02-16 NOTE — PLAN OF CARE
Assumed care 1900  Patient alert and oriented x4  Pain controlled with oxy PRN  Tube feeds running cyclic- increased per orders  Abdomen distended, denies nausea or abdominal bloating  + Gas - BM

## 2021-02-17 VITALS
WEIGHT: 125.19 LBS | HEIGHT: 64 IN | SYSTOLIC BLOOD PRESSURE: 90 MMHG | DIASTOLIC BLOOD PRESSURE: 61 MMHG | TEMPERATURE: 99 F | HEART RATE: 106 BPM | BODY MASS INDEX: 21.37 KG/M2 | OXYGEN SATURATION: 95 % | RESPIRATION RATE: 18 BRPM

## 2021-02-17 LAB
ANION GAP SERPL CALC-SCNC: 5 MMOL/L (ref 0–18)
BUN BLD-MCNC: 19 MG/DL (ref 7–18)
BUN/CREAT SERPL: 37.3 (ref 10–20)
CALCIUM BLD-MCNC: 8.2 MG/DL (ref 8.5–10.1)
CHLORIDE SERPL-SCNC: 109 MMOL/L (ref 98–112)
CO2 SERPL-SCNC: 27 MMOL/L (ref 21–32)
CREAT BLD-MCNC: 0.51 MG/DL
DEPRECATED RDW RBC AUTO: 44.8 FL (ref 35.1–46.3)
ERYTHROCYTE [DISTWIDTH] IN BLOOD BY AUTOMATED COUNT: 13 % (ref 11–15)
GLUCOSE BLD-MCNC: 121 MG/DL (ref 70–99)
GLUCOSE BLD-MCNC: 126 MG/DL (ref 70–99)
GLUCOSE BLD-MCNC: 87 MG/DL (ref 70–99)
HCT VFR BLD AUTO: 31.9 %
HGB BLD-MCNC: 10.1 G/DL
MCH RBC QN AUTO: 29.9 PG (ref 26–34)
MCHC RBC AUTO-ENTMCNC: 31.7 G/DL (ref 31–37)
MCV RBC AUTO: 94.4 FL
OSMOLALITY SERPL CALC.SUM OF ELEC: 296 MOSM/KG (ref 275–295)
PLATELET # BLD AUTO: 136 10(3)UL (ref 150–450)
POTASSIUM SERPL-SCNC: 4.7 MMOL/L (ref 3.5–5.1)
RBC # BLD AUTO: 3.38 X10(6)UL
SODIUM SERPL-SCNC: 141 MMOL/L (ref 136–145)
WBC # BLD AUTO: 5.6 X10(3) UL (ref 4–11)

## 2021-02-17 RX ORDER — OXYCODONE HCL 5 MG/5 ML
5 SOLUTION, ORAL ORAL EVERY 6 HOURS PRN
Qty: 120 ML | Refills: 0 | Status: SHIPPED | OUTPATIENT
Start: 2021-02-17 | End: 2022-02-01

## 2021-02-17 RX ORDER — ENOXAPARIN SODIUM 100 MG/ML
40 INJECTION SUBCUTANEOUS DAILY
Qty: 14 SYRINGE | Refills: 0 | Status: SHIPPED | OUTPATIENT
Start: 2021-02-17 | End: 2021-11-19

## 2021-02-17 RX ORDER — SERTRALINE HYDROCHLORIDE 20 MG/ML
150 SOLUTION ORAL DAILY
Qty: 225 ML | Refills: 0 | Status: SHIPPED | OUTPATIENT
Start: 2021-02-17 | End: 2021-03-19

## 2021-02-17 RX ORDER — ACETAMINOPHEN 160 MG/5ML
1000 SOLUTION ORAL EVERY 6 HOURS
Qty: 473 ML | Refills: 0 | Status: SHIPPED | OUTPATIENT
Start: 2021-02-17

## 2021-02-17 RX ORDER — SERTRALINE HYDROCHLORIDE 20 MG/ML
150 SOLUTION ORAL DAILY
Status: DISCONTINUED | OUTPATIENT
Start: 2021-02-17 | End: 2021-02-17

## 2021-02-17 NOTE — CM/SW NOTE
02/17/21 1300   Discharge disposition   Expected discharge disposition Home-Health   Name of Facillity/Home Care/Hospice   ([address])   Home services after discharge DME;Skilled home care   HME provider   ([facility] for tube feedings)   Discharge transportat

## 2021-02-17 NOTE — PLAN OF CARE
Assumed care at 1930  Abdominal pain managed with tylenol  Zofran given this morning, phlymy emesis this morning  Loose stool this morning as well  TF infusing, no residual  Abdominal incision c/d/i  Needs met will continue to monitor          Problem: SERENA

## 2021-02-17 NOTE — PROGRESS NOTES
Surgical Oncology Inpatient Progress Note    Subjective:  No acute events overnight. Patient stable and afebrile.   UGI reviewed, some narrowing but stent open   + flatus, + BM  Pain controlled      Objective:  Temp:  [97.9 °F (36.6 °C)-99.1 °F (37.3 °C)] progressing as anticipated.   S/p upper endoscopy and stent placement per Dr. Kosta Dale - UGI reviewed stent open, but still with narrowing needs more time to expand  Diet: Continue clear liquid diet, continue tube feeds (cycle), if tolerates clears may adva

## 2021-02-17 NOTE — PLAN OF CARE
Assumed care @0730  VSS,   A&Ox4,   RA   NSR ,   Pain controlled with scheduled tylenol. Up with standby assist.     Moderate urine output. Abdominal Incision c/d/I with dressings and abd binder in place. Abd slightly distended/gas. 1 BM loose. for Discharge     Problem: RISK FOR INFECTION - ADULT  Goal: Absence of fever/infection during anticipated neutropenic period  Description: INTERVENTIONS  - Monitor WBC  - Administer growth factors as ordered  - Implement neutropenic guidelines  Outcome: A wound care per orders  - Initiate isolation precautions as appropriate  - Initiate Pressure Ulcer prevention bundle as indicated  Outcome: Adequate for Discharge

## 2021-02-17 NOTE — CM/SW NOTE
Pt is ready for dc today. Regis already delivered the tube feedings to pt's room and son took them home. Located within [facility] [phone] accepted pt for RN/PT. They will see pt on Thursday.   Faxed clinical infor to [facility] at [phone] (they are not on Aidin)

## 2021-02-18 ENCOUNTER — TELEPHONE (OUTPATIENT)
Dept: SURGERY | Facility: CLINIC | Age: 56
End: 2021-02-18

## 2021-02-18 NOTE — TELEPHONE ENCOUNTER
Called to check on patient. She is doing well since being home and had a almas sleep. Pain controlled and denies fever or chills. Staples to be removed Friday 2/26. Tube feeds and HH have been appropriately arranged.  She will call us with an update of

## 2021-02-18 NOTE — DISCHARGE SUMMARY
[facility]  Discharge Summary    Sharon Walter Patient Status:  Inpatient    3/5/1965 MRN [id number]   [facility] 7NE-A Attending No att. providers found   Hosp Day # 5 PCP Florine Sandhoff, MD     Date of Admission: 2021 2/14/2021: EGD with enteral stent placement  PROCEDURE:  2/12/2021  1. Exploratory laparotomy with extensive lysis of adhesions. 2.       Jejunostomy tube placement. 3.       Intraoperative ultrasound.       Complications: None     Disposition: Marilu  2/18/2021  4:47 PM

## 2021-02-25 ENCOUNTER — TELEPHONE (OUTPATIENT)
Dept: HEMATOLOGY/ONCOLOGY | Facility: HOSPITAL | Age: 56
End: 2021-02-25

## 2021-03-03 ENCOUNTER — TELEPHONE (OUTPATIENT)
Dept: SURGERY | Facility: CLINIC | Age: 56
End: 2021-03-03

## 2021-03-03 NOTE — TELEPHONE ENCOUNTER
Spoke with patient's home health nurse who reports patient is down 6 lbs since discharge from the hospital. She is currently receiving tube feeds at goal and toelrating. She is tolerating minimal intake. No N/V. No diarrhea.  Discussed with Elzbieta Delarosa

## 2021-03-15 ENCOUNTER — TELEPHONE (OUTPATIENT)
Dept: SURGERY | Facility: CLINIC | Age: 56
End: 2021-03-15

## 2021-03-15 NOTE — TELEPHONE ENCOUNTER
Home health called and stated patient having hyper granulation tissue at j tube site with grayish colored drainage. Called patient and she currently stated she has not been using gauze for a barrier.   I informed her to use the split gauze between skin and

## 2021-03-17 ENCOUNTER — TELEPHONE (OUTPATIENT)
Dept: SURGERY | Facility: CLINIC | Age: 56
End: 2021-03-17

## 2021-03-17 NOTE — TELEPHONE ENCOUNTER
Patient sent photos of J tube site. Pt has some hyper granulation at site. Suggested to patient we would use silver nitrate at the site of hyper granulation.   Pt lives far away and I told her she could talk to her PCP about doing it or we could see her h

## 2021-03-26 NOTE — ANESTHESIA POSTPROCEDURE EVALUATION
[address] [facility] Patient Status:  Inpatient   Age/Gender 63year old female MRN [id number]   [facility] SURGERY Attending Natalie Hdz MD   Hosp Day # 0 PCP Julisa Desouza MD       Anesthesia Post-op Note    Procedur Discharged

## 2021-07-28 ENCOUNTER — LAB ENCOUNTER (OUTPATIENT)
Dept: LAB | Facility: HOSPITAL | Age: 56
End: 2021-07-28
Attending: INTERNAL MEDICINE
Payer: COMMERCIAL

## 2021-07-28 DIAGNOSIS — C80.1 CARCINOMA (HCC): Primary | ICD-10-CM

## 2021-07-29 PROCEDURE — 88360 TUMOR IMMUNOHISTOCHEM/MANUAL: CPT

## 2021-08-02 LAB — PERCENT OF CELLS/CIRCUMFEREN: 0

## 2021-11-15 ENCOUNTER — HOSPITAL ENCOUNTER (INPATIENT)
Facility: HOSPITAL | Age: 56
LOS: 4 days | Discharge: HOME HEALTH | DRG: 375 | End: 2021-11-19
Attending: SURGERY | Admitting: SURGERY
Payer: COMMERCIAL

## 2021-11-15 DIAGNOSIS — I82.A12 DVT OF LEFT AXILLARY VEIN, ACUTE (HCC): Primary | ICD-10-CM

## 2021-11-15 PROBLEM — C16.9 GASTRIC CARCINOMA (HCC): Status: ACTIVE | Noted: 2021-11-15

## 2021-11-15 PROCEDURE — 99222 1ST HOSP IP/OBS MODERATE 55: CPT | Performed by: SURGERY

## 2021-11-15 PROCEDURE — 99253 IP/OBS CNSLTJ NEW/EST LOW 45: CPT | Performed by: INTERNAL MEDICINE

## 2021-11-15 RX ORDER — OXYCODONE HCL 5 MG/5 ML
5 SOLUTION, ORAL ORAL EVERY 6 HOURS PRN
Status: DISCONTINUED | OUTPATIENT
Start: 2021-11-15 | End: 2021-11-19

## 2021-11-15 RX ORDER — ONDANSETRON 2 MG/ML
4 INJECTION INTRAMUSCULAR; INTRAVENOUS EVERY 6 HOURS PRN
Status: DISCONTINUED | OUTPATIENT
Start: 2021-11-15 | End: 2021-11-16

## 2021-11-15 RX ORDER — BUSPIRONE HYDROCHLORIDE 15 MG/1
7.5 TABLET ORAL 2 TIMES DAILY
Status: DISCONTINUED | OUTPATIENT
Start: 2021-11-15 | End: 2021-11-15

## 2021-11-15 RX ORDER — ONDANSETRON HYDROCHLORIDE 4 MG/5ML
4 SOLUTION ORAL AS NEEDED
COMMUNITY

## 2021-11-15 RX ORDER — LORAZEPAM 2 MG/ML
0.4 CONCENTRATE ORAL EVERY 8 HOURS PRN
COMMUNITY

## 2021-11-15 RX ORDER — ACETAMINOPHEN 325 MG/1
650 TABLET ORAL EVERY 6 HOURS PRN
Status: DISCONTINUED | OUTPATIENT
Start: 2021-11-15 | End: 2021-11-19

## 2021-11-15 RX ORDER — SODIUM CHLORIDE, SODIUM LACTATE, POTASSIUM CHLORIDE, CALCIUM CHLORIDE 600; 310; 30; 20 MG/100ML; MG/100ML; MG/100ML; MG/100ML
INJECTION, SOLUTION INTRAVENOUS CONTINUOUS
Status: DISCONTINUED | OUTPATIENT
Start: 2021-11-15 | End: 2021-11-19

## 2021-11-15 RX ORDER — MELATONIN
3 NIGHTLY PRN
Status: DISCONTINUED | OUTPATIENT
Start: 2021-11-15 | End: 2021-11-19

## 2021-11-15 RX ORDER — SERTRALINE HYDROCHLORIDE 20 MG/ML
150 SOLUTION ORAL DAILY
COMMUNITY

## 2021-11-15 RX ORDER — LORAZEPAM 2 MG/ML
0.4 CONCENTRATE ORAL EVERY 8 HOURS PRN
Status: DISCONTINUED | OUTPATIENT
Start: 2021-11-15 | End: 2021-11-19

## 2021-11-15 RX ORDER — PROCHLORPERAZINE EDISYLATE 5 MG/ML
5 INJECTION INTRAMUSCULAR; INTRAVENOUS EVERY 8 HOURS PRN
Status: DISCONTINUED | OUTPATIENT
Start: 2021-11-15 | End: 2021-11-16

## 2021-11-15 RX ORDER — FLUTICASONE PROPIONATE 50 MCG
2 SPRAY, SUSPENSION (ML) NASAL
Status: DISCONTINUED | OUTPATIENT
Start: 2021-11-15 | End: 2021-11-19

## 2021-11-15 RX ORDER — TEMAZEPAM 15 MG/1
15 CAPSULE ORAL NIGHTLY PRN
Status: DISCONTINUED | OUTPATIENT
Start: 2021-11-15 | End: 2021-11-19

## 2021-11-15 NOTE — CONSULTS
[facility] HOSPITALIST  CONSULT     Teresacaro Terri Patient Status:  Inpatient    3/5/1965 MRN [id number]   [facility] 7NE-A Attending Philip Rice MD   Hosp Day # 0 PCP Glenis Guzman MD     Reason for consult: Medical management • UPPER GI ENDOSCOPY,EXAM      x 3     Social History:  reports that she has never smoked. She has never used smokeless tobacco. She reports that she does not drink alcohol and does not use drugs.     Family History:   Family History   Problem Relation Ag 104/69 (BP Location: Left arm)   Temp 98.5 °F (36.9 °C) (Oral)   Resp 18   LMP 04/15/2016   SpO2 97%   General: No acute distress. Alert and oriented x 3. HEENT: Normocephalic atraumatic. Moist mucous membranes. EOM-I. PERRLA. Anicteric.   Neck: Trachea mi and on Buspar      Quality:  · DVT Prophylaxis: SCDs  · CODE status: Full  · Lauren: No    Plan of care discussed with patient, RN    Sang Awad,   11/15/2021

## 2021-11-15 NOTE — H&P
[address] [facility] Surgical Oncology        Patient Name:  Jg Rogers   YOB: 1965   Gender:  Female   Appt Date:  11/9/2021   Provider:  General Tony THOMAS         CHIEF COMPLAINT  Met Gastric cancer     PROBLEMS  Reviewed Patient Activ Personal history of antineoplastic chemotherapy     last chemo: 2020   • Tinnitus    • Visual impairment     glasses      Reviewed:  Past Surgical History:   Procedure Laterality Date   •      • Cholecystectomy     • Colonoscopy      x 2   • La do not have outside imaging for my review as patient did not bring. It seems that patient has progression of disease even her nausea/vomiting despite previously placed enteral stent.    Dr. Hu Raymundo aware and patient admitted for EGD/stent evaluation/re-s

## 2021-11-16 ENCOUNTER — APPOINTMENT (OUTPATIENT)
Dept: GENERAL RADIOLOGY | Facility: HOSPITAL | Age: 56
DRG: 375 | End: 2021-11-16
Attending: INTERNAL MEDICINE
Payer: COMMERCIAL

## 2021-11-16 ENCOUNTER — ANESTHESIA EVENT (OUTPATIENT)
Dept: ENDOSCOPY | Facility: HOSPITAL | Age: 56
DRG: 375 | End: 2021-11-16
Payer: COMMERCIAL

## 2021-11-16 ENCOUNTER — ANESTHESIA (OUTPATIENT)
Dept: ENDOSCOPY | Facility: HOSPITAL | Age: 56
DRG: 375 | End: 2021-11-16
Payer: COMMERCIAL

## 2021-11-16 PROCEDURE — 74360 X-RAY GUIDE GI DILATION: CPT | Performed by: INTERNAL MEDICINE

## 2021-11-16 PROCEDURE — 99231 SBSQ HOSP IP/OBS SF/LOW 25: CPT | Performed by: SURGERY

## 2021-11-16 PROCEDURE — 0D768DZ DILATION OF STOMACH WITH INTRALUMINAL DEVICE, VIA NATURAL OR ARTIFICIAL OPENING ENDOSCOPIC: ICD-10-PCS | Performed by: INTERNAL MEDICINE

## 2021-11-16 PROCEDURE — 99232 SBSQ HOSP IP/OBS MODERATE 35: CPT | Performed by: HOSPITALIST

## 2021-11-16 DEVICE — STENT SYSTEM WITH ANCHOR LOCK DELIVERY SYSTEM
Type: IMPLANTABLE DEVICE | Site: DUODENAL | Status: FUNCTIONAL
Brand: WALLFLEX™ DUODENAL

## 2021-11-16 RX ORDER — MEPERIDINE HYDROCHLORIDE 25 MG/ML
12.5 INJECTION INTRAMUSCULAR; INTRAVENOUS; SUBCUTANEOUS AS NEEDED
Status: DISCONTINUED | OUTPATIENT
Start: 2021-11-16 | End: 2021-11-16 | Stop reason: HOSPADM

## 2021-11-16 RX ORDER — NALOXONE HYDROCHLORIDE 0.4 MG/ML
80 INJECTION, SOLUTION INTRAMUSCULAR; INTRAVENOUS; SUBCUTANEOUS AS NEEDED
Status: DISCONTINUED | OUTPATIENT
Start: 2021-11-16 | End: 2021-11-16 | Stop reason: HOSPADM

## 2021-11-16 RX ORDER — HYDROCODONE BITARTRATE AND ACETAMINOPHEN 10; 325 MG/1; MG/1
1 TABLET ORAL AS NEEDED
Status: DISCONTINUED | OUTPATIENT
Start: 2021-11-16 | End: 2021-11-16 | Stop reason: HOSPADM

## 2021-11-16 RX ORDER — METOCLOPRAMIDE HYDROCHLORIDE 5 MG/ML
10 INJECTION INTRAMUSCULAR; INTRAVENOUS AS NEEDED
Status: DISCONTINUED | OUTPATIENT
Start: 2021-11-16 | End: 2021-11-16 | Stop reason: HOSPADM

## 2021-11-16 RX ORDER — MIDAZOLAM HYDROCHLORIDE 1 MG/ML
1 INJECTION INTRAMUSCULAR; INTRAVENOUS EVERY 5 MIN PRN
Status: DISCONTINUED | OUTPATIENT
Start: 2021-11-16 | End: 2021-11-16 | Stop reason: HOSPADM

## 2021-11-16 RX ORDER — SODIUM CHLORIDE 9 MG/ML
INJECTION, SOLUTION INTRAVENOUS CONTINUOUS PRN
Status: DISCONTINUED | OUTPATIENT
Start: 2021-11-16 | End: 2021-11-16 | Stop reason: SURG

## 2021-11-16 RX ORDER — HYDROCODONE BITARTRATE AND ACETAMINOPHEN 10; 325 MG/1; MG/1
2 TABLET ORAL AS NEEDED
Status: DISCONTINUED | OUTPATIENT
Start: 2021-11-16 | End: 2021-11-16 | Stop reason: HOSPADM

## 2021-11-16 RX ORDER — ONDANSETRON 2 MG/ML
INJECTION INTRAMUSCULAR; INTRAVENOUS
Status: COMPLETED
Start: 2021-11-16 | End: 2021-11-16

## 2021-11-16 RX ORDER — PROCHLORPERAZINE EDISYLATE 5 MG/ML
10 INJECTION INTRAMUSCULAR; INTRAVENOUS EVERY 6 HOURS PRN
Status: DISCONTINUED | OUTPATIENT
Start: 2021-11-16 | End: 2021-11-19

## 2021-11-16 RX ORDER — HYDROMORPHONE HYDROCHLORIDE 1 MG/ML
0.4 INJECTION, SOLUTION INTRAMUSCULAR; INTRAVENOUS; SUBCUTANEOUS EVERY 5 MIN PRN
Status: DISCONTINUED | OUTPATIENT
Start: 2021-11-16 | End: 2021-11-16 | Stop reason: HOSPADM

## 2021-11-16 RX ORDER — ONDANSETRON 2 MG/ML
4 INJECTION INTRAMUSCULAR; INTRAVENOUS AS NEEDED
Status: DISCONTINUED | OUTPATIENT
Start: 2021-11-16 | End: 2021-11-16 | Stop reason: HOSPADM

## 2021-11-16 RX ORDER — ONDANSETRON 2 MG/ML
8 INJECTION INTRAMUSCULAR; INTRAVENOUS EVERY 6 HOURS PRN
Status: DISCONTINUED | OUTPATIENT
Start: 2021-11-16 | End: 2021-11-19

## 2021-11-16 RX ORDER — LIDOCAINE HYDROCHLORIDE 10 MG/ML
INJECTION, SOLUTION EPIDURAL; INFILTRATION; INTRACAUDAL; PERINEURAL AS NEEDED
Status: DISCONTINUED | OUTPATIENT
Start: 2021-11-16 | End: 2021-11-16 | Stop reason: SURG

## 2021-11-16 RX ORDER — DEXTROSE MONOHYDRATE 25 G/50ML
50 INJECTION, SOLUTION INTRAVENOUS
Status: DISCONTINUED | OUTPATIENT
Start: 2021-11-16 | End: 2021-11-19

## 2021-11-16 RX ORDER — SODIUM CHLORIDE, SODIUM LACTATE, POTASSIUM CHLORIDE, CALCIUM CHLORIDE 600; 310; 30; 20 MG/100ML; MG/100ML; MG/100ML; MG/100ML
INJECTION, SOLUTION INTRAVENOUS CONTINUOUS
Status: DISCONTINUED | OUTPATIENT
Start: 2021-11-16 | End: 2021-11-19

## 2021-11-16 RX ADMIN — LIDOCAINE HYDROCHLORIDE 25 MG: 10 INJECTION, SOLUTION EPIDURAL; INFILTRATION; INTRACAUDAL; PERINEURAL at 19:22:00

## 2021-11-16 RX ADMIN — SODIUM CHLORIDE: 9 INJECTION, SOLUTION INTRAVENOUS at 19:24:00

## 2021-11-16 RX ADMIN — SODIUM CHLORIDE: 9 INJECTION, SOLUTION INTRAVENOUS at 19:51:00

## 2021-11-16 NOTE — PLAN OF CARE
NURSING ADMISSION NOTE      Patient admitted via Wheelchair  Oriented to room. Safety precautions initiated. Bed in low position. Call light in reach.     Received patient A/Farhat, ST GEOVANNI on tele around 1615  Admission Navigator completed  GI consulted

## 2021-11-16 NOTE — CONSULTS
[facility]                       [facility]astroenterology Consultation-[facility]    Tone Murray Patient Status:  Inpatient    3/5/1965 MRN [id number]   [facility] 7NE-A Attending Philip Rice MD   Hosp Day # 1 PCP L Exploratory laparotomy, lysis of adhesions, jejunostomy tube placement, intraoperative ultrasound ([facility]).     • OTHER SURGICAL HISTORY  8/12/16    Total gastrectomy with regional lymphadenectomy, omentectomy, partial hepatectomy, Angel-en-Y esophagojejunost history of CAD, prior MI, chest pain, or palpitations            Respiratory: No shortness of breath, asthma, copd, recurrent pneumonia            Hematologic: The patient reports no easy bruising, frequent gum bleeding or nose bleeding;  + chronic anemia 11/16/2021    K 3.4 11/16/2021     11/16/2021    CO2 29.0 11/16/2021    GLU 91 11/16/2021    CA 7.7 11/16/2021    ALB 1.4 11/16/2021    ALKPHO 104 11/16/2021    BILT 0.5 11/16/2021    AST 16 11/16/2021    ALT 13 11/16/2021    INR 1.11 11/16/2021    P SHASHANK[facility] This is a 60 yo woman with gastric adenocarcinoma and peritoneal mets, s/p prior reductive surgery and with nkechi-en-y. The patient has previously developed obstruction in the efferent limb, which required stenting.   Over the

## 2021-11-16 NOTE — PROGRESS NOTES
[facility]     Hospitalist Progress Note     Thyra Favre Patient Status:  Inpatient    3/5/1965 MRN [id number]   [facility] 7NE-A Attending Kervin Bass MD   Hosp Day # 1 PCP Elisa Gipson MD     Chief Complaint: follow u Assessment & Plan:         1. Gastric carcinoma with peritoneal carcinomatosis sp HIPEC (2016) sp exlap with DOUGLAS, jejunostomy tube placement; intraoperative ultrasound revealed external compression of jejunum d/t metastatic disease 2/12 sp enteral st

## 2021-11-16 NOTE — PROGRESS NOTES
Surgical Oncology Inpatient Progress Note    Subjective:  MIKI  VSS    Objective:  Temp:  [98.2 °F (36.8 °C)-98.5 °F (36.9 °C)] 98.2 °F (36.8 °C)  Pulse:  [] 92  Resp:  [16-18] 18  BP: ()/(66-69) 95/66    Intake/Output:      Intake/Output Summar my opinion.     Care discussed as above    Jose Farias MD

## 2021-11-17 ENCOUNTER — APPOINTMENT (OUTPATIENT)
Dept: CT IMAGING | Facility: HOSPITAL | Age: 56
DRG: 375 | End: 2021-11-17
Attending: NURSE PRACTITIONER
Payer: COMMERCIAL

## 2021-11-17 PROCEDURE — 74177 CT ABD & PELVIS W/CONTRAST: CPT | Performed by: NURSE PRACTITIONER

## 2021-11-17 PROCEDURE — 99232 SBSQ HOSP IP/OBS MODERATE 35: CPT | Performed by: SURGERY

## 2021-11-17 PROCEDURE — 99232 SBSQ HOSP IP/OBS MODERATE 35: CPT | Performed by: HOSPITALIST

## 2021-11-17 NOTE — IMAGING NOTE
Order placed for patient to have CT enterography. Due to patient's h/o obstructive symptoms and CT for evaluation of stricture. I contacted Dr. Alejandro Hurd as h/o small bowel obstruction is a contraindication for glucagon.  Dr. Alejandro Hurd stated to NOT give the glucag

## 2021-11-17 NOTE — ANESTHESIA POSTPROCEDURE EVALUATION
[address] [facility] Patient Status:  Inpatient   Age/Gender 61year old female MRN [id number]   Location [id number] [place] [address] Attending Raven Quintana MD   Hosp Day # 1 PCP Khurram Gonzales MD       Anesthesia Post-op No

## 2021-11-17 NOTE — PLAN OF CARE
Assumed care at 0730  A&O x4, RA, NSR  NPO since midnight   Dry heaving at times, denying need for zofran   Hypoglycemia protocol in evening (see MAR)   EGD with possible stenting done at evening  Ambulating to bathroom and hallway  Patient complaint of di

## 2021-11-17 NOTE — PLAN OF CARE
Received pt from PACU at 2030. Pt a/ox4. VSS. NSR/ST per tele. RA. Jtube site leaking, dressing changed per protocol. Tube feeds started per order, tolerating well. Pt dry heaving, zofran prn  and compazine prn given. Dr. Garima Barbosa notified.  Pt and family u

## 2021-11-17 NOTE — PROGRESS NOTES
Patient feels about the same. Had several episodes of 'vomiting'  Still reports leaking at J-tube site. Had bowel movement.     10/16/2021: EGD with enteral stent placement    Blood pressure 93/64, pulse 109, temperature 98.3 °F (36.8 °C), temperature jd

## 2021-11-17 NOTE — PROGRESS NOTES
11/17/21 1110   Clinical Encounter Type   Visited With Patient   Routine Visit   ( provided introductory visit with patient.  Patient resting and would like a follow up visit from a .)   Patient Spiritual Encounters   Spiritual Needs Corazon

## 2021-11-17 NOTE — PROGRESS NOTES
[facility]     Hospitalist Progress Note     Ishakrishna Fraga Patient Status:  Inpatient    3/5/1965 MRN [id number]   [facility] 7NE-A Attending Enedina Del Valle MD   Hosp Day # 2 PCP Bere Galindo MD     Chief Complaint: follow u Epic.    Medications:   • potassium chloride  40 mEq Intravenous Once   • sertraline  150 mg Oral Daily       Assessment & Plan:         1. Gastric carcinoma with peritoneal carcinomatosis sp HIPEC (2016) sp exlap with DOUGLAS, jejunostomy tube placement; intr

## 2021-11-17 NOTE — PROGRESS NOTES
Gastroenterology Progress Note  Patient Name: Jesse Pizano  Chief Complaint: Gastric cancer with jejunal obstruction, n/v.  Leaking j-tube  S: The patient underwent EGD with metal stent revision after recurrent jejunal limb obstruction was appreci downstream obstructive pathology. In addition, she has had leaking from her j-tube.   The fact that she had bowel movements / diarrhea after tube feeding last night, rather than inducing bloating and vomiting, suggests that she does have some degree of int

## 2021-11-17 NOTE — PAYOR COMM NOTE
--------------  ADMISSION REVIEW     Payor: [employer] [address] #:  [id number]  Authorization Number: [id number]    Admit date: 11/15/21  Admit time:  3:24 PM       REVIEW DOCUMENTATION:  ED Provider Notes    No notes of this type exist for (Oral)   Resp 18   LMP 04/15/2016   SpO2 97%      Medications Reviewed:  No current outpatient medications on file.      Allergies Reviewed:    Metoclopramide          OTHER (SEE COMMENTS)    Comment:agitation  Penicillins             UNKNOWN    Comment:As Physical Examination:  Constitutional: NAD. Eyes: Sclera: non-icteric. Lymph Nodes: Lymph Nodes no cervical LAD, supraclavicular LAD, axillary LAD, or inguinal LAD.    Lungs: good breath sounds  Cardiovascular: Reg by Tele   Abdomen: soft, no mass mg Intravenous Dontrell Blunt MD      LORazepam (INTENSOL) 2 MG/ML ORAL concentrated solution 0.4 mg     Date Action Dose Route User    11/17/2021 0145 Given 0.4 mg Oral Carla Martines RN      ondansetron [facility]) injection 4 mg     Date Action Dose Rou 18 96/66 94 % — None (Room air) —     11/17/21 0053 98.6 °F (37 °C) 97 18 107/69 96 % — None (Room air) —     11/16/21 2030 98.7 °F (37.1 °C) 91 14 106/76 100 % — None (Room air) —     11/16/21 2010 — — — — 98 % — — —     11/16/21 2010 — 85 11 92/6 PLAN:      1. Gastric carcinoma with peritoneal carcinomatosis sp HIPEC (2016) sp exlap with DOUGLAS, jejunostomy tube placement; intraoperative ultrasound revealed external compression of jejunum d/t metastatic disease 2/12 sp enteral stent now here for EGD a bowel sounds; No hepatosplenomegaly; no rebound or guarding;  No ascites is clinically apparent; no tympany to percussion; J-tube intact LLQ--skin excoriated around insertion site; healed abd incisions   Labs:         Lab Results   Component Value Date     procedure  Appreciate recs from the medical teams     11/16 HOSPITALIST NOTE     Assessment & Plan:             1.  Gastric carcinoma with peritoneal carcinomatosis sp HIPEC (2016) sp exlap with DOUGLAS, jejunostomy tube placement; intraoperative ultrasound rev surgical changes c/w total gastrectomy and esophago-jejunal anastomosis, there was a short afferent loop of jejunum and a just distal to anastomosis we identified a tight stricture of the previously placed  efferent loop stent, unable to traverse with ther

## 2021-11-17 NOTE — PROGRESS NOTES
11/17/21 6947   Over the last 2 weeks, how often have you been bothered by any of the following problems?    Little interest or pleasure in doing things 1   Feeling down, depressed, or hopeless 1   Trouble falling or staying asleep, or sleeping too much

## 2021-11-17 NOTE — ANESTHESIA PREPROCEDURE EVALUATION
PRE-OP EVALUATION    Patient Name: Abdirashid Judd    Admit Diagnosis: Nausea & vomiting [R11.2]  Gastric cancer (RUSTca 75.) [C16.9]    Pre-op Diagnosis: add on    ESOPHAGOGASTRODUODENOSCOPY (EGD) WITH FLUOROSCOPY AND STENT PLACEMENT    Anesthesia Procedur at 0900  ondansetron 4 MG/5ML Oral Solution, Take 4 mg by mouth once. 5ml/10ml, Disp: , Rfl: , Past Week at Unknown time  sertraline 20 MG/ML Oral Conc, Take 150 mg by mouth daily. , Disp: , Rfl: , 11/14/2021 at Unknown time  acetaminophen 160 MG/5ML Oral S Procedure Laterality Date   •      • CHOLECYSTECTOMY     • COLONOSCOPY      x 2   • LAPAROSCOPY,DIAGNOSTIC     • OTHER  2021    Exploratory laparotomy, lysis of adhesions, jejunostomy tube placement, intraoperative ultrasound ([facility]).     •

## 2021-11-17 NOTE — OPERATIVE REPORT
Davy Norman Patient Status:  Inpatient    3/5/1965 MRN [id number]   Location [address] [place] [address] Attending Bobby Sheets MD   Date 2021 PCP Susan Lam MD     PREOPERATIVE DIAGNOSIS/INDICATION: H/o gastric cancer, guidewire pass the stricture into the proximal small bowel, then through the scope over the guidewire a instruMagic enteral stent uncovered metal stent 22 mm x 6 cm, we deployed the stent bridging the stricture, placement was good, this was conformed

## 2021-11-18 ENCOUNTER — APPOINTMENT (OUTPATIENT)
Dept: INTERVENTIONAL RADIOLOGY/VASCULAR | Facility: HOSPITAL | Age: 56
DRG: 375 | End: 2021-11-18
Attending: NURSE PRACTITIONER
Payer: COMMERCIAL

## 2021-11-18 ENCOUNTER — APPOINTMENT (OUTPATIENT)
Dept: ULTRASOUND IMAGING | Facility: HOSPITAL | Age: 56
DRG: 375 | End: 2021-11-18
Attending: HOSPITALIST
Payer: COMMERCIAL

## 2021-11-18 PROCEDURE — 99231 SBSQ HOSP IP/OBS SF/LOW 25: CPT | Performed by: PHYSICIAN ASSISTANT

## 2021-11-18 PROCEDURE — 99255 IP/OBS CONSLTJ NEW/EST HI 80: CPT | Performed by: INTERNAL MEDICINE

## 2021-11-18 PROCEDURE — 99232 SBSQ HOSP IP/OBS MODERATE 35: CPT | Performed by: INTERNAL MEDICINE

## 2021-11-18 PROCEDURE — 93971 EXTREMITY STUDY: CPT | Performed by: HOSPITALIST

## 2021-11-18 PROCEDURE — 02HV33Z INSERTION OF INFUSION DEVICE INTO SUPERIOR VENA CAVA, PERCUTANEOUS APPROACH: ICD-10-PCS | Performed by: RADIOLOGY

## 2021-11-18 PROCEDURE — BD16ZZZ FLUOROSCOPY OF UPPER GI AND SMALL BOWEL: ICD-10-PCS | Performed by: RADIOLOGY

## 2021-11-18 PROCEDURE — 0DHA3UZ INSERTION OF FEEDING DEVICE INTO JEJUNUM, PERCUTANEOUS APPROACH: ICD-10-PCS | Performed by: RADIOLOGY

## 2021-11-18 RX ORDER — HEPARIN SODIUM 5000 [USP'U]/ML
80 INJECTION INTRAVENOUS; SUBCUTANEOUS ONCE
Status: COMPLETED | OUTPATIENT
Start: 2021-11-18 | End: 2021-11-18

## 2021-11-18 RX ORDER — ENOXAPARIN SODIUM 100 MG/ML
50 INJECTION SUBCUTANEOUS EVERY 12 HOURS
Qty: 60 EACH | Refills: 0 | Status: SHIPPED | OUTPATIENT
Start: 2021-11-18

## 2021-11-18 RX ORDER — MIDAZOLAM HYDROCHLORIDE 1 MG/ML
INJECTION INTRAMUSCULAR; INTRAVENOUS
Status: COMPLETED
Start: 2021-11-18 | End: 2021-11-18

## 2021-11-18 RX ORDER — HEPARIN SODIUM AND DEXTROSE 10000; 5 [USP'U]/100ML; G/100ML
18 INJECTION INTRAVENOUS ONCE
Status: COMPLETED | OUTPATIENT
Start: 2021-11-18 | End: 2021-11-18

## 2021-11-18 RX ORDER — LIDOCAINE HYDROCHLORIDE 10 MG/ML
INJECTION, SOLUTION INFILTRATION; PERINEURAL
Status: COMPLETED
Start: 2021-11-18 | End: 2021-11-18

## 2021-11-18 RX ORDER — HEPARIN SODIUM AND DEXTROSE 10000; 5 [USP'U]/100ML; G/100ML
INJECTION INTRAVENOUS CONTINUOUS
Status: DISCONTINUED | OUTPATIENT
Start: 2021-11-18 | End: 2021-11-19

## 2021-11-18 NOTE — CONSULTS
Hem/Onc Report of Consultation    Patient Name: Beatrice Segundo   YOB: 1965   Medical Record Number: [id number]   CSN: [id number]   Consulting Physician: Dr. Neli Hoang  Referring Provider(s): Dr. Eros Yung   Date of Consultation: 11/18/2021 systemic treatment break; developed abdominal symptoms, nausea/vomiting in Dec 2020  -CT a/p with contrast 1/12/2021: confluent RP mass measuring 3.6 x 4.1cm (previously 3.4 x 3.7cm)  -PET CT 1/25/21: RP mass above demonstrating mild SUV uptake 3.2, concer hepatectomy, Angel-en-Y esophagojejunostomy, Partial resection of left diaphragm, Bilateral salpingo-oophorectomy and HIPEC with Mitomycin-C.   • PORT, INDWELLING, IMP     • UPPER GI ENDOSCOPY,EXAM      x 3       Family Medical History:  Family History   Pr Or  glucose-vitamin C (DEX-4) chewable tab 4 tablet, 4 tablet, Oral, Q15 Min PRN   Or  dextrose 50 % injection 50 mL, 50 mL, Intravenous, Q15 Min PRN   Or  glucose (DEX4) oral liquid 30 g, 30 g, Oral, Q15 Min PRN   Or  glucose-vitamin C (DEX-4) chewable ta daily. famoTIDine 20 MG Oral Tab, Take 20 mg by mouth 2 (two) times daily. Multiple Vitamins-Minerals (MULTI-DAY PLUS MINERALS) Oral Tab, Take 1 tablet by mouth daily. Biotin w/ Vitamins C & E (HAIR/SKIN/NAILS OR), Take 1 tablet by mouth daily. Value Ref Range    POC Glucose 93 70 - 99 mg/dL     CBC:    Lab Results   Component Value Date    WBC 7.3 11/16/2021    WBC 5.6 02/17/2021    WBC 5.9 02/16/2021     Lab Results   Component Value Date    HGB 10.3 (L) 11/16/2021    HGB 10.1 (L) 02/17/2021 Nilda Danielle MD on 11/17/2021 at 2:49 PM     Finalized by (CST): Veto Bedoya MD on 11/17/2021 at 3:11 PM       Impression/Plan    Left subclavian and axillary thrombus: likely provoked by left port-a-cath.  Sluggish blood return lately per patient, will check d noted. LUE Doppler showed: DVT in the L subclavian vein and axillary vein. Labs reviewed with normocytic anemia. LUE DVT: L subclavian vein and axillary vein.  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

## 2021-11-18 NOTE — PLAN OF CARE
Received patient A/Ox4, RA, NSR on tele at 0730  With N/V overnight, MD aware, CT ordered, contrast through j-tube, see results  Zofran for nausea  Up to bathroom with one, frequent diarrhea, minimal urine output, MD notified, see MAR  Swelling to LUE, no

## 2021-11-18 NOTE — PROGRESS NOTES
Patient feels about the same. Still reports leaking at J-tube site. LUE swelling, US donw    10/16/2021: EGD with enteral stent placement    Blood pressure 92/56, pulse 102, temperature 98.4 °F (36.9 °C), temperature source Oral, resp.  rate 18, weight 5

## 2021-11-18 NOTE — PROGRESS NOTES
11/18/21 1258   Clinical Encounter Type   Visited With Patient and family together   Routine Visit Follow-up   Patient's Supportive Strategies/Resources Identified her family support, friend support, fco community resources. Respected and supported.

## 2021-11-18 NOTE — PROCEDURES
[facility]  Procedure Note    Kamari Aiken Patient Status:  Inpatient    3/5/1965 MRN [id number]   Location [address] [facility] Attending Alistair Mack MD   Hosp Day # 3 PCP Nile Guardado MD     Procedure: Mirtha Hoyt

## 2021-11-18 NOTE — PLAN OF CARE
Assumed care @1732  VSS,   A&Ox4, RA  NSR ,   Denies Pain, Up with standby assist as tolerated. Walked halls in AM.  NPO. J tube exchange completed. OK for clear liquid. No diarrhea. No nausea/vomiting.   Intake and outputs w/d/l.    J tube with gauze/ta

## 2021-11-18 NOTE — PROGRESS NOTES
[facility]     Hospitalist Progress Note     Sharon Walter Patient Status:  Inpatient    3/5/1965 MRN [id number]   [facility] 7NE-A Attending Elida Cockayne, MD   Hosp Day # 3 PCP Florine Sandhoff, MD     Chief Complaint: follow u CA 7.7* 7.9*  --    ALB 1.4* 1.5*  --     146*  --    K 3.4* 3.5  3.4* 3.3*   * 114*  --    CO2 29.0 28.0  --    ALKPHO 104 101  --    AST 16 15  --    ALT 13 15  --    BILT 0.5 0.4  --    TP 4.9* 5.2*  --        Estimated Creatinine Clearanc EVI Hernandez MD  11/18/2021      Supplementary Documentation:     Quality:  · DVT Prophylaxis: SCDS  · CODE status: full  · Lauren: no  · Central line: no  · If COVID testing is negative, may discontinue isolation: yes     Will the patient be referre

## 2021-11-18 NOTE — PLAN OF CARE
Assumed care at 1. Pt a/ox4. VSS. NSR per tele. RA. Denies pain. Zofran iv prn given for nausea w/ relief. Tube feeds from 8p-12a. Npo since midnight for jtube exchange. IVF infusing per order. Pt updated w/ POC. Call light in reach. Needs attended to.

## 2021-11-18 NOTE — DIETARY NOTE
[facility]    NUTRITION ASSESSMENT    Pt does not meet malnutrition criteria. NUTRITION INTERVENTION:  1. Meal and Snacks - monitor patient po intake. Encourage adequate po of appropriate diet.   2. Medical Food Supplements - RD added Magic Cup Nagi Bowers 2100   Percent Meals Eaten (%): NPO at 11/18/21 0916           FOOD/NUTRITION RELATED HISTORY:   Appetite: Good  Intake: 100%  Intake Meeting Needs: TF meeting needs  Food Allergies: No  Cultural/Ethnic/Methodist Preferences Addresses: Yes    GI SYSTEM REV

## 2021-11-18 NOTE — PROGRESS NOTES
Gastroenterology Progress Note  Patient Name: Isha Fraga  Chief Complaint: vomiting, abdominal pain, diarrhea, leaking j-tube  S: The patient reported that she did vomit her Temazepam yesterday, but has otherwise felt better today.   No n/v th contralateral Subclavian and Jugular.       PATIENT STATED HISTORY: (As transcribed by Technologist) Luisito Huitron states she has left arm swelling.            FINDINGS:     EXTREMITY:  Left upper extremity   THROMBI:  Partial deep venous thrombus involves the

## 2021-11-19 VITALS
DIASTOLIC BLOOD PRESSURE: 53 MMHG | HEART RATE: 106 BPM | RESPIRATION RATE: 18 BRPM | SYSTOLIC BLOOD PRESSURE: 99 MMHG | WEIGHT: 114 LBS | OXYGEN SATURATION: 97 % | BODY MASS INDEX: 20 KG/M2 | TEMPERATURE: 98 F

## 2021-11-19 PROCEDURE — 99232 SBSQ HOSP IP/OBS MODERATE 35: CPT | Performed by: INTERNAL MEDICINE

## 2021-11-19 PROCEDURE — 99231 SBSQ HOSP IP/OBS SF/LOW 25: CPT | Performed by: SURGERY

## 2021-11-19 RX ORDER — ENOXAPARIN SODIUM 100 MG/ML
50 INJECTION SUBCUTANEOUS EVERY 12 HOURS SCHEDULED
Status: DISCONTINUED | OUTPATIENT
Start: 2021-11-19 | End: 2021-11-19

## 2021-11-19 NOTE — PROGRESS NOTES
Patient feels about the same. Diagnosed with LUE DVT-->heparin    10/16/2021: EGD with enteral stent placement  10/19/2021: J-tube--please upsize to 16 FR     Blood pressure 94/55, pulse 106, temperature 98 °F (36.7 °C), temperature source Oral, resp.  ra

## 2021-11-19 NOTE — PROGRESS NOTES
[facility]     Hospitalist Progress Note     Eduardo Villavicencio Patient Status:  Inpatient    3/5/1965 MRN [id number]   [facility] 7NE-A Attending Hu Jarquin MD   Hosp Day # 4 PCP Winter Sheppard MD     Chief Complaint: follow u --    ALKPHO 104  --  101  --   --    AST 16  --  15  --   --    ALT 13  --  15  --   --    BILT 0.5  --  0.4  --   --    TP 4.9*  --  5.2*  --   --     < > = values in this interval not displayed.        Estimated Creatinine Clearance: 84.4 mL/min (based o with GI as directed. Diet and J-tube care per GI and surgical oncologist.  Follow-up with regular outpatient primary care physician within 1 week in office.   Hospitalist service on consult and will sign off    Plan of care discussed with patient,  Discuss

## 2021-11-19 NOTE — CM/SW NOTE
11/19/21 1200   Discharge disposition   Expected discharge disposition [address]  ([address] [facility])   DME/Infusion Providers [place] [facility]   Discharge transportation Private car     Referr

## 2021-11-19 NOTE — DIETARY NOTE
Clinical Nutrition    Received call from RN to clarify TF orders.    Recommend continue home TF: Doris Bud 1.4 @ 100ml/hr x 13hrs (1734-8317 - or per pt)    This provides 1300ml, 1820kcals, 80g protein, 936ml free h20 & 100% RDIs    Recommend 180 H20 Flush

## 2021-11-19 NOTE — PROGRESS NOTES
[facility] Hematology and Oncology Progress Note   Length of Stay: 4    Subjective: J tube placed. Back on hep gtt. Arm is still somewhat swollen, really no changes.      ROS: 12 Point ROS completed and pertinent positives are above     Summary per Dr. Rito Fry abdominal RP infiltrative soft tissue mass, likely related to recurrent tumor  -7/1/21 - present: 5FU/Nivolumab maintenance per [facility] 649    Objective:   • sertraline  150 mg Oral Daily     glucose **OR** glucose-vitamin C **OR** dextrose **OR** glucos a good NOAC candidate.  However, we might be able to consider Eliquis in the future since a majority is absorbed in the distal small bowel  - Hep gtt for now   - Plan for Lovenox 1 mg/kg BID on discharge (Rx sent).     Metastatic Gastric Cancer  -on 5-FU/Ni

## 2021-11-19 NOTE — PLAN OF CARE
Assumed care. Patient with intermittent nausea. Compazine/zofran given with some relief. TF started tonight. J-tube site still leaking. Gauze/dressing changed. Needs addressed. Heparin gtt infusing as ordered. Awaiting this am ptt. Siderails up.  Call light

## 2021-11-19 NOTE — CONSULTS
[facility]  Report of Inpatient Wound Care Consultation    [place] [facility] Patient Status:  Inpatient    3/5/1965 MRN [id number]   [facility] 7NE-A Attending Sera Head MD   Hosp Day # 4 PCP Lori Oilver MD     Reason for --   --   --  9.3*  --   --   --   --   --    HCT 32.0*  --   --   --   --   --  30.1*  --   --   --   --   --    .0  --   --   --   --   --  220.0  --   --   --  213.0  --    CREATSERUM 0.61  --  0.60  --   --   --   --   --   --   --   --   --

## 2021-11-20 ENCOUNTER — TELEPHONE (OUTPATIENT)
Dept: SURGERY | Facility: CLINIC | Age: 56
End: 2021-11-20

## 2021-11-20 NOTE — TELEPHONE ENCOUNTER
Called patient to check on her. She is doing fairly well. Slept all night.   To follow-up with Dr. Ashlee Michel from medical oncology

## 2021-11-20 NOTE — DISCHARGE SUMMARY
[facility]  Discharge Summary    Tone Murray Patient Status:  Inpatient    3/5/1965 MRN [id number]   [facility] 7NE-A Attending No att. providers found   Hosp Day # 4 PCP Glenis Guzman MD     Date of Admission: 11/15/2021 upper extremity--->Partial deep venous thrombus involves the proximal subclavian vein with complete thrombus involving the distal subclavian and axillary veins.  Thrombus also involves superficial proximal basilic vein.     Procedures:  As above    Disposit

## 2021-11-20 NOTE — PROGRESS NOTES
[facility]     Foy Babinski Patient Status:  Inpatient    3/5/1965 MRN [id number]   [facility] 7NE-A Attending No att. providers found   Hosp Day # 4  [facility] --   --    ALKPHO 104  --  101  --   --    AST 16  --  15  --   --    ALT 13  --  15  --   --    BILT 0.5  --  0.4  --   --    TP 4.9*  --  5.2*  --   --     < > = values in this interval not displayed.        Recent Labs   Lab 11/16/21  0556   PTP 14.5   I

## 2021-11-22 NOTE — PAYOR COMM NOTE
Discharge Notification    Patient Name: Katharine Stauffer  Payor: LIYAH Ruth 95 #: [id number]  Authorization Number: [id number]  Admit Date/Time: 11/15/2021 3:24 PM  Discharge Date/Time: 11/19/2021 5:00 PM

## 2022-01-19 RX ORDER — SODIUM CHLORIDE, SODIUM LACTATE, POTASSIUM CHLORIDE, CALCIUM CHLORIDE 600; 310; 30; 20 MG/100ML; MG/100ML; MG/100ML; MG/100ML
INJECTION, SOLUTION INTRAVENOUS CONTINUOUS
Status: CANCELLED | OUTPATIENT
Start: 2022-01-19

## 2022-01-20 ENCOUNTER — ANESTHESIA EVENT (OUTPATIENT)
Dept: ENDOSCOPY | Facility: HOSPITAL | Age: 57
DRG: 375 | End: 2022-01-20
Payer: COMMERCIAL

## 2022-01-20 ENCOUNTER — APPOINTMENT (OUTPATIENT)
Dept: GENERAL RADIOLOGY | Facility: HOSPITAL | Age: 57
DRG: 375 | End: 2022-01-20
Attending: INTERNAL MEDICINE
Payer: COMMERCIAL

## 2022-01-20 ENCOUNTER — HOSPITAL ENCOUNTER (INPATIENT)
Facility: HOSPITAL | Age: 57
LOS: 12 days | Discharge: HOME HEALTH | DRG: 375 | End: 2022-02-01
Attending: INTERNAL MEDICINE | Admitting: INTERNAL MEDICINE
Payer: COMMERCIAL

## 2022-01-20 ENCOUNTER — ANESTHESIA (OUTPATIENT)
Dept: ENDOSCOPY | Facility: HOSPITAL | Age: 57
DRG: 375 | End: 2022-01-20
Payer: COMMERCIAL

## 2022-01-20 DIAGNOSIS — C16.9 MALIGNANT NEOPLASM OF STOMACH, UNSPECIFIED LOCATION (HCC): Primary | ICD-10-CM

## 2022-01-20 DIAGNOSIS — C16.9 GASTRIC ADENOCARCINOMA (HCC): ICD-10-CM

## 2022-01-20 PROCEDURE — 99223 1ST HOSP IP/OBS HIGH 75: CPT | Performed by: HOSPITALIST

## 2022-01-20 PROCEDURE — 0D7A8DZ DILATION OF JEJUNUM WITH INTRALUMINAL DEVICE, VIA NATURAL OR ARTIFICIAL OPENING ENDOSCOPIC: ICD-10-PCS | Performed by: INTERNAL MEDICINE

## 2022-01-20 DEVICE — STENT SYSTEM WITH ANCHOR LOCK DELIVERY SYSTEM
Type: IMPLANTABLE DEVICE | Site: DUODENAL | Status: FUNCTIONAL
Brand: WALLFLEX™ DUODENAL

## 2022-01-20 RX ORDER — BISACODYL 10 MG
10 SUPPOSITORY, RECTAL RECTAL
Status: DISCONTINUED | OUTPATIENT
Start: 2022-01-20 | End: 2022-02-01

## 2022-01-20 RX ORDER — METOCLOPRAMIDE HYDROCHLORIDE 5 MG/ML
10 INJECTION INTRAMUSCULAR; INTRAVENOUS AS NEEDED
Status: DISCONTINUED | OUTPATIENT
Start: 2022-01-20 | End: 2022-01-20 | Stop reason: HOSPADM

## 2022-01-20 RX ORDER — HYDROMORPHONE HYDROCHLORIDE 1 MG/ML
0.4 INJECTION, SOLUTION INTRAMUSCULAR; INTRAVENOUS; SUBCUTANEOUS
Status: DISCONTINUED | OUTPATIENT
Start: 2022-01-20 | End: 2022-01-20

## 2022-01-20 RX ORDER — ONDANSETRON 2 MG/ML
4 INJECTION INTRAMUSCULAR; INTRAVENOUS EVERY 6 HOURS PRN
Status: DISCONTINUED | OUTPATIENT
Start: 2022-01-20 | End: 2022-01-20

## 2022-01-20 RX ORDER — DEXTROSE, SODIUM CHLORIDE, SODIUM LACTATE, POTASSIUM CHLORIDE, AND CALCIUM CHLORIDE 5; .6; .31; .03; .02 G/100ML; G/100ML; G/100ML; G/100ML; G/100ML
100 INJECTION, SOLUTION INTRAVENOUS CONTINUOUS
Status: DISCONTINUED | OUTPATIENT
Start: 2022-01-20 | End: 2022-01-22

## 2022-01-20 RX ORDER — POLYETHYLENE GLYCOL 3350 17 G/17G
17 POWDER, FOR SOLUTION ORAL DAILY PRN
Status: DISCONTINUED | OUTPATIENT
Start: 2022-01-20 | End: 2022-02-01

## 2022-01-20 RX ORDER — HYDROCODONE BITARTRATE AND ACETAMINOPHEN 10; 325 MG/1; MG/1
1 TABLET ORAL AS NEEDED
Status: DISCONTINUED | OUTPATIENT
Start: 2022-01-20 | End: 2022-01-20 | Stop reason: HOSPADM

## 2022-01-20 RX ORDER — HYDROMORPHONE HYDROCHLORIDE 1 MG/ML
0.4 INJECTION, SOLUTION INTRAMUSCULAR; INTRAVENOUS; SUBCUTANEOUS EVERY 2 HOUR PRN
Status: DISCONTINUED | OUTPATIENT
Start: 2022-01-20 | End: 2022-01-22

## 2022-01-20 RX ORDER — SODIUM PHOSPHATE, DIBASIC AND SODIUM PHOSPHATE, MONOBASIC 7; 19 G/133ML; G/133ML
1 ENEMA RECTAL ONCE AS NEEDED
Status: DISCONTINUED | OUTPATIENT
Start: 2022-01-20 | End: 2022-02-01

## 2022-01-20 RX ORDER — HYDROMORPHONE HYDROCHLORIDE 1 MG/ML
0.5 INJECTION, SOLUTION INTRAMUSCULAR; INTRAVENOUS; SUBCUTANEOUS ONCE
Status: COMPLETED | OUTPATIENT
Start: 2022-01-20 | End: 2022-01-20

## 2022-01-20 RX ORDER — SODIUM CHLORIDE, SODIUM LACTATE, POTASSIUM CHLORIDE, CALCIUM CHLORIDE 600; 310; 30; 20 MG/100ML; MG/100ML; MG/100ML; MG/100ML
INJECTION, SOLUTION INTRAVENOUS CONTINUOUS
Status: DISCONTINUED | OUTPATIENT
Start: 2022-01-20 | End: 2022-01-21

## 2022-01-20 RX ORDER — TEMAZEPAM 15 MG/1
15 CAPSULE ORAL NIGHTLY PRN
Status: DISCONTINUED | OUTPATIENT
Start: 2022-01-20 | End: 2022-02-01

## 2022-01-20 RX ORDER — OXYCODONE HCL 5 MG/5 ML
5 SOLUTION, ORAL ORAL EVERY 6 HOURS PRN
Status: DISCONTINUED | OUTPATIENT
Start: 2022-01-20 | End: 2022-01-24

## 2022-01-20 RX ORDER — HYDROMORPHONE HYDROCHLORIDE 1 MG/ML
0.8 INJECTION, SOLUTION INTRAMUSCULAR; INTRAVENOUS; SUBCUTANEOUS EVERY 2 HOUR PRN
Status: DISCONTINUED | OUTPATIENT
Start: 2022-01-20 | End: 2022-01-22

## 2022-01-20 RX ORDER — ONDANSETRON 2 MG/ML
4 INJECTION INTRAMUSCULAR; INTRAVENOUS AS NEEDED
Status: DISCONTINUED | OUTPATIENT
Start: 2022-01-20 | End: 2022-01-20 | Stop reason: HOSPADM

## 2022-01-20 RX ORDER — HYDROMORPHONE HYDROCHLORIDE 1 MG/ML
INJECTION, SOLUTION INTRAMUSCULAR; INTRAVENOUS; SUBCUTANEOUS
Status: DISPENSED
Start: 2022-01-20 | End: 2022-01-20

## 2022-01-20 RX ORDER — FAMOTIDINE 20 MG/1
20 TABLET, FILM COATED ORAL 2 TIMES DAILY
Status: DISCONTINUED | OUTPATIENT
Start: 2022-01-20 | End: 2022-01-21

## 2022-01-20 RX ORDER — ENOXAPARIN SODIUM 100 MG/ML
50 INJECTION SUBCUTANEOUS EVERY 12 HOURS
Status: DISCONTINUED | OUTPATIENT
Start: 2022-01-20 | End: 2022-01-21

## 2022-01-20 RX ORDER — SERTRALINE HYDROCHLORIDE 20 MG/ML
150 SOLUTION ORAL DAILY
Status: DISCONTINUED | OUTPATIENT
Start: 2022-01-20 | End: 2022-02-01

## 2022-01-20 RX ORDER — ONDANSETRON 2 MG/ML
4 INJECTION INTRAMUSCULAR; INTRAVENOUS EVERY 6 HOURS PRN
Status: DISCONTINUED | OUTPATIENT
Start: 2022-01-20 | End: 2022-02-01

## 2022-01-20 RX ORDER — LIDOCAINE HYDROCHLORIDE 10 MG/ML
INJECTION, SOLUTION EPIDURAL; INFILTRATION; INTRACAUDAL; PERINEURAL AS NEEDED
Status: DISCONTINUED | OUTPATIENT
Start: 2022-01-20 | End: 2022-01-20 | Stop reason: SURG

## 2022-01-20 RX ORDER — NALOXONE HYDROCHLORIDE 0.4 MG/ML
80 INJECTION, SOLUTION INTRAMUSCULAR; INTRAVENOUS; SUBCUTANEOUS AS NEEDED
Status: DISCONTINUED | OUTPATIENT
Start: 2022-01-20 | End: 2022-01-20 | Stop reason: HOSPADM

## 2022-01-20 RX ORDER — SENNOSIDES 8.6 MG
17.2 TABLET ORAL NIGHTLY PRN
Status: DISCONTINUED | OUTPATIENT
Start: 2022-01-20 | End: 2022-02-01

## 2022-01-20 RX ORDER — HYDROMORPHONE HYDROCHLORIDE 1 MG/ML
0.2 INJECTION, SOLUTION INTRAMUSCULAR; INTRAVENOUS; SUBCUTANEOUS EVERY 2 HOUR PRN
Status: DISCONTINUED | OUTPATIENT
Start: 2022-01-20 | End: 2022-01-22

## 2022-01-20 RX ORDER — ACETAMINOPHEN 325 MG/1
650 TABLET ORAL EVERY 6 HOURS PRN
Status: DISCONTINUED | OUTPATIENT
Start: 2022-01-20 | End: 2022-02-01

## 2022-01-20 RX ORDER — HYDROMORPHONE HYDROCHLORIDE 1 MG/ML
INJECTION, SOLUTION INTRAMUSCULAR; INTRAVENOUS; SUBCUTANEOUS
Status: DISPENSED
Start: 2022-01-20 | End: 2022-01-21

## 2022-01-20 RX ORDER — PHENYLEPHRINE HCL 10 MG/ML
VIAL (ML) INJECTION AS NEEDED
Status: DISCONTINUED | OUTPATIENT
Start: 2022-01-20 | End: 2022-01-20 | Stop reason: SURG

## 2022-01-20 RX ORDER — HYDROCODONE BITARTRATE AND ACETAMINOPHEN 10; 325 MG/1; MG/1
2 TABLET ORAL AS NEEDED
Status: DISCONTINUED | OUTPATIENT
Start: 2022-01-20 | End: 2022-01-20 | Stop reason: HOSPADM

## 2022-01-20 RX ORDER — MELATONIN
3 NIGHTLY PRN
Status: DISCONTINUED | OUTPATIENT
Start: 2022-01-20 | End: 2022-02-01

## 2022-01-20 RX ORDER — HYDROMORPHONE HYDROCHLORIDE 1 MG/ML
0.4 INJECTION, SOLUTION INTRAMUSCULAR; INTRAVENOUS; SUBCUTANEOUS EVERY 5 MIN PRN
Status: DISCONTINUED | OUTPATIENT
Start: 2022-01-20 | End: 2022-01-20 | Stop reason: HOSPADM

## 2022-01-20 RX ORDER — PROCHLORPERAZINE EDISYLATE 5 MG/ML
5 INJECTION INTRAMUSCULAR; INTRAVENOUS EVERY 8 HOURS PRN
Status: DISCONTINUED | OUTPATIENT
Start: 2022-01-20 | End: 2022-01-21

## 2022-01-20 RX ORDER — LORAZEPAM 2 MG/ML
0.4 CONCENTRATE ORAL EVERY 8 HOURS PRN
Status: DISCONTINUED | OUTPATIENT
Start: 2022-01-20 | End: 2022-02-01

## 2022-01-20 RX ORDER — PROCHLORPERAZINE MALEATE 5 MG/1
5 TABLET ORAL EVERY 6 HOURS PRN
Status: DISCONTINUED | OUTPATIENT
Start: 2022-01-20 | End: 2022-01-21

## 2022-01-20 RX ADMIN — SODIUM CHLORIDE, SODIUM LACTATE, POTASSIUM CHLORIDE, CALCIUM CHLORIDE: 600; 310; 30; 20 INJECTION, SOLUTION INTRAVENOUS at 12:48:00

## 2022-01-20 RX ADMIN — PHENYLEPHRINE HCL 100 MCG: 10 MG/ML VIAL (ML) INJECTION at 11:46:00

## 2022-01-20 RX ADMIN — LIDOCAINE HYDROCHLORIDE 25 MG: 10 INJECTION, SOLUTION EPIDURAL; INFILTRATION; INTRACAUDAL; PERINEURAL at 11:37:00

## 2022-01-20 RX ADMIN — PHENYLEPHRINE HCL 100 MCG: 10 MG/ML VIAL (ML) INJECTION at 11:43:00

## 2022-01-20 RX ADMIN — SODIUM CHLORIDE, SODIUM LACTATE, POTASSIUM CHLORIDE, CALCIUM CHLORIDE: 600; 310; 30; 20 INJECTION, SOLUTION INTRAVENOUS at 11:36:00

## 2022-01-20 NOTE — PLAN OF CARE
Arrived to floor @ 1400  Patient alert, oriented x4  J tube dressing changed  IV fluids infusing through PICC  meds through J tube  zofran given for nausea  Dilaudid and oxy given for abd pain  Vomited small amount of blood. GI notified  Clear liquid diet    Possible dc tomorrow.  Plan of care discussed with patient

## 2022-01-20 NOTE — ANESTHESIA POSTPROCEDURE EVALUATION
[address] [facility] Patient Status:  Hospital Outpatient Surgery   Age/Gender 61year old female MRN [id number]   Location [id number] [place] [address] Attending Ever Blanco MD   Hosp Day # 0 PCP Anju Wallace MD

## 2022-01-20 NOTE — ANESTHESIA PREPROCEDURE EVALUATION
PRE-OP EVALUATION    Patient Name: Filemon Jaramillo    Admit Diagnosis: Malignant neoplasm of stomach, unspecified location [facility]) [C16.9]    Pre-op Diagnosis: Malignant neoplasm of stomach, unspecified location ([facility] [C16.9]    ALANA Monteiro reviewed.     Anesthetic Complications           GI/Hepatic/Renal                                 Cardiovascular                                                       Endo/Other                                  Pulmonary                           Neuro/Psyc guidelines.           Plan/risks discussed with: patient                Present on Admission:  **None**

## 2022-01-21 ENCOUNTER — APPOINTMENT (OUTPATIENT)
Dept: ULTRASOUND IMAGING | Facility: HOSPITAL | Age: 57
DRG: 375 | End: 2022-01-21
Attending: STUDENT IN AN ORGANIZED HEALTH CARE EDUCATION/TRAINING PROGRAM
Payer: COMMERCIAL

## 2022-01-21 ENCOUNTER — APPOINTMENT (OUTPATIENT)
Dept: ULTRASOUND IMAGING | Facility: HOSPITAL | Age: 57
DRG: 375 | End: 2022-01-21
Attending: HOSPITALIST
Payer: COMMERCIAL

## 2022-01-21 LAB
ALBUMIN SERPL-MCNC: 2.5 G/DL (ref 3.4–5)
ALBUMIN/GLOB SERPL: 0.7 {RATIO} (ref 1–2)
ALT SERPL-CCNC: 212 U/L
ANION GAP SERPL CALC-SCNC: 3 MMOL/L (ref 0–18)
AST SERPL-CCNC: 730 U/L (ref 15–37)
BILIRUB SERPL-MCNC: 1 MG/DL (ref 0.1–2)
BUN BLD-MCNC: 11 MG/DL (ref 7–18)
CALCIUM BLD-MCNC: 5.9 MG/DL (ref 8.5–10.1)
CHLORIDE SERPL-SCNC: 102 MMOL/L (ref 98–112)
CO2 SERPL-SCNC: 32 MMOL/L (ref 21–32)
DEPRECATED HBV CORE AB SER IA-ACNC: 594.4 NG/ML
ERYTHROCYTE [DISTWIDTH] IN BLOOD BY AUTOMATED COUNT: 14.4 %
GLOBULIN PLAS-MCNC: 3.6 G/DL (ref 2.8–4.4)
GLUCOSE BLD-MCNC: 123 MG/DL (ref 70–99)
HAV IGM SER QL: NONREACTIVE
HBV CORE IGM SER QL: NONREACTIVE
HBV SURFACE AG SERPL QL IA: NONREACTIVE
HCT VFR BLD AUTO: 33 %
HCV AB SERPL QL IA: NONREACTIVE
HGB BLD-MCNC: 10.8 G/DL
HGB BLD-MCNC: 8.6 G/DL
HGB RETIC QN AUTO: 33.7 PG (ref 28.2–36.6)
IMM RETICS NFR: 0.11 RATIO (ref 0.1–0.3)
IRON SATN MFR SERPL: 4 %
IRON SERPL-MCNC: 9 UG/DL
MAGNESIUM SERPL-MCNC: 1.8 MG/DL (ref 1.6–2.6)
MCH RBC QN AUTO: 30.4 PG (ref 26–34)
MCHC RBC AUTO-ENTMCNC: 32.7 G/DL (ref 31–37)
MCV RBC AUTO: 93 FL
OSMOLALITY SERPL CALC.SUM OF ELEC: 285 MOSM/KG (ref 275–295)
PHOSPHATE SERPL-MCNC: 3.2 MG/DL (ref 2.5–4.9)
PLATELET # BLD AUTO: 129 10(3)UL (ref 150–450)
POTASSIUM SERPL-SCNC: 4.7 MMOL/L (ref 3.5–5.1)
PROT SERPL-MCNC: 6.1 G/DL (ref 6.4–8.2)
RBC # BLD AUTO: 3.55 X10(6)UL
RETICS # AUTO: 64.4 X10(3) UL (ref 22.5–147.5)
RETICS/RBC NFR AUTO: 1.8 %
SODIUM SERPL-SCNC: 137 MMOL/L (ref 136–145)
TIBC SERPL-MCNC: 238 UG/DL (ref 240–450)
TRANSFERRIN SERPL-MCNC: 160 MG/DL (ref 200–360)
WBC # BLD AUTO: 8.2 X10(3) UL (ref 4–11)

## 2022-01-21 PROCEDURE — 99233 SBSQ HOSP IP/OBS HIGH 50: CPT | Performed by: HOSPITALIST

## 2022-01-21 PROCEDURE — 93975 VASCULAR STUDY: CPT | Performed by: STUDENT IN AN ORGANIZED HEALTH CARE EDUCATION/TRAINING PROGRAM

## 2022-01-21 PROCEDURE — 76700 US EXAM ABDOM COMPLETE: CPT | Performed by: STUDENT IN AN ORGANIZED HEALTH CARE EDUCATION/TRAINING PROGRAM

## 2022-01-21 PROCEDURE — 93971 EXTREMITY STUDY: CPT | Performed by: HOSPITALIST

## 2022-01-21 RX ORDER — MAGNESIUM SULFATE HEPTAHYDRATE 40 MG/ML
2 INJECTION, SOLUTION INTRAVENOUS ONCE
Status: COMPLETED | OUTPATIENT
Start: 2022-01-21 | End: 2022-01-21

## 2022-01-21 RX ORDER — PROCHLORPERAZINE EDISYLATE 5 MG/ML
10 INJECTION INTRAMUSCULAR; INTRAVENOUS EVERY 8 HOURS PRN
Status: DISCONTINUED | OUTPATIENT
Start: 2022-01-21 | End: 2022-02-01

## 2022-01-21 RX ORDER — ENOXAPARIN SODIUM 100 MG/ML
40 INJECTION SUBCUTANEOUS NIGHTLY
Status: DISCONTINUED | OUTPATIENT
Start: 2022-01-21 | End: 2022-01-23

## 2022-01-21 RX ORDER — ONDANSETRON 2 MG/ML
4 INJECTION INTRAMUSCULAR; INTRAVENOUS EVERY 8 HOURS
Status: DISCONTINUED | OUTPATIENT
Start: 2022-01-21 | End: 2022-02-01

## 2022-01-21 NOTE — PLAN OF CARE
Assumed care 1900  Patient alert, oriented x4  Abdominal pain, given dilaudid  Nausea medication  GI aware of nausea and bloody spit/emesis  J tube dressing changed, small amount bile drainage  Critical labs relayed to physician Heber PRICE SANDIE Mccormick NP

## 2022-01-21 NOTE — PLAN OF CARE
Assumed patient care at 0730. Tearful at times. C/o intermittent nausea; Relief with scheduled medications. Some bloody sputum and emesis. Hgb 8.6. Plan to resume lovenox tonight per Dr. Perera Screen. C/o abdominal pain; Relief with PRN dilaudid and oxy. US abdomen complete. Magnesium replaced per protocol. Unable to tolerate clear liquids for dinner; had about 3 bites of jello and a sip of apple juice. J-tube dressing changed x2. Unable to void; bladder scan <400. Had 150mL of fredy urine output this shift; Dr. Perera Screen notified. IV fluids infusing per order. Ambulating with standby assist.  Plan of care discussed with patient and physicians.

## 2022-01-22 ENCOUNTER — APPOINTMENT (OUTPATIENT)
Dept: ULTRASOUND IMAGING | Facility: HOSPITAL | Age: 57
DRG: 375 | End: 2022-01-22
Attending: STUDENT IN AN ORGANIZED HEALTH CARE EDUCATION/TRAINING PROGRAM
Payer: COMMERCIAL

## 2022-01-22 LAB
ALBUMIN SERPL-MCNC: 2.3 G/DL (ref 3.4–5)
ALBUMIN/GLOB SERPL: 0.6 {RATIO} (ref 1–2)
ALP LIVER SERPL-CCNC: 647 U/L
ALT SERPL-CCNC: 131 U/L
ANION GAP SERPL CALC-SCNC: 5 MMOL/L (ref 0–18)
AST SERPL-CCNC: 243 U/L (ref 15–37)
BASOPHILS # BLD AUTO: 0.02 X10(3) UL (ref 0–0.2)
BASOPHILS NFR BLD AUTO: 0.3 %
BILIRUB SERPL-MCNC: 0.6 MG/DL (ref 0.1–2)
BUN BLD-MCNC: 5 MG/DL (ref 7–18)
CALCIUM BLD-MCNC: 8.3 MG/DL (ref 8.5–10.1)
CHLORIDE SERPL-SCNC: 103 MMOL/L (ref 98–112)
CMV ANTIBODY IGG: >10 U/ML
CMV ANTIBODY IGM: 210 AU/ML
CO2 SERPL-SCNC: 30 MMOL/L (ref 21–32)
CREAT BLD-MCNC: 0.44 MG/DL
EOSINOPHIL # BLD AUTO: 0.05 X10(3) UL (ref 0–0.7)
EOSINOPHIL NFR BLD AUTO: 0.8 %
ERYTHROCYTE [DISTWIDTH] IN BLOOD BY AUTOMATED COUNT: 14.6 %
GLOBULIN PLAS-MCNC: 3.7 G/DL (ref 2.8–4.4)
GLUCOSE BLD-MCNC: 108 MG/DL (ref 70–99)
HCT VFR BLD AUTO: 25.9 %
HGB BLD-MCNC: 8 G/DL
IMM GRANULOCYTES # BLD AUTO: 0.01 X10(3) UL (ref 0–1)
IMM GRANULOCYTES NFR BLD: 0.2 %
LYMPHOCYTES # BLD AUTO: 1.23 X10(3) UL (ref 1–4)
LYMPHOCYTES NFR BLD AUTO: 19.7 %
MAGNESIUM SERPL-MCNC: 1.9 MG/DL (ref 1.6–2.6)
MCH RBC QN AUTO: 29.9 PG (ref 26–34)
MCHC RBC AUTO-ENTMCNC: 30.9 G/DL (ref 31–37)
MCV RBC AUTO: 96.6 FL
MONOCYTES NFR BLD AUTO: 6.1 %
NEUTROPHILS # BLD AUTO: 4.54 X10 (3) UL (ref 1.5–7.7)
NEUTROPHILS # BLD AUTO: 4.54 X10(3) UL (ref 1.5–7.7)
NEUTROPHILS NFR BLD AUTO: 72.9 %
PLATELET # BLD AUTO: 87 10(3)UL (ref 150–450)
POTASSIUM SERPL-SCNC: 3.4 MMOL/L (ref 3.5–5.1)
PROT SERPL-MCNC: 6 G/DL (ref 6.4–8.2)
RBC # BLD AUTO: 2.68 X10(6)UL
SODIUM SERPL-SCNC: 138 MMOL/L (ref 136–145)
WBC # BLD AUTO: 6.2 X10(3) UL (ref 4–11)

## 2022-01-22 PROCEDURE — 99233 SBSQ HOSP IP/OBS HIGH 50: CPT | Performed by: HOSPITALIST

## 2022-01-22 PROCEDURE — 3E0336Z INTRODUCTION OF NUTRITIONAL SUBSTANCE INTO PERIPHERAL VEIN, PERCUTANEOUS APPROACH: ICD-10-PCS | Performed by: HOSPITALIST

## 2022-01-22 RX ORDER — DEXTROSE, SODIUM CHLORIDE, SODIUM LACTATE, POTASSIUM CHLORIDE, AND CALCIUM CHLORIDE 5; .6; .31; .03; .02 G/100ML; G/100ML; G/100ML; G/100ML; G/100ML
INJECTION, SOLUTION INTRAVENOUS CONTINUOUS
Status: DISCONTINUED | OUTPATIENT
Start: 2022-01-22 | End: 2022-01-25

## 2022-01-22 RX ORDER — HYDROMORPHONE HYDROCHLORIDE 1 MG/ML
0.5 INJECTION, SOLUTION INTRAMUSCULAR; INTRAVENOUS; SUBCUTANEOUS EVERY 2 HOUR PRN
Status: DISCONTINUED | OUTPATIENT
Start: 2022-01-22 | End: 2022-01-23

## 2022-01-22 RX ORDER — OXYCODONE HCL 5 MG/5 ML
10 SOLUTION, ORAL ORAL EVERY 4 HOURS PRN
Status: DISCONTINUED | OUTPATIENT
Start: 2022-01-22 | End: 2022-01-24

## 2022-01-22 NOTE — PROGRESS NOTES
Assumed care of pt at [address]. Alert, oriented x4  Unable to tolerate clears. Having frequent, small episodes of emesis. No blood noted. Receiving scheduled Zofran and prn Compazine and Ativan. PRN Dilaudid given for abdominal pain. J-tube with small amount of green drainage. Dressing changed. IVF infusing. Up with sba. Needs attended to.

## 2022-01-22 NOTE — PROGRESS NOTES
[facility] Resource Referral Counselor Note    Christi Ivan Patient Status:  Inpatient    3/5/1965 MRN [id number]   [facility] 7NE-A Attending Ever Blanco MD   Hosp Day # 2 PCP Anju Wallace MD       S(subjective) \"I have depression, I see a psychotherapist every other week. \"    O(objective) PHQ 9 score was 5. Patient pleasant and interactive, stated she has pain all over her body. Patient admits history of depression, stated she's been seeing a psychotherapist every other week. Patient stated stated she takes clonazepam for sleep. A(assessment) Patient alert and oriented x 3, patient admits depression symptoms but denies any suicidal thought. P(plan) Patient will continue to see her own provider, Terri Ching contact information provided to patient.       Joy Brock RN  2022  2:57 PM

## 2022-01-22 NOTE — PLAN OF CARE
TPN ordered to start tonight per Dr Valentin Edwards and Dr Yary Hanna. Patient c/o pain to abdomen. Dilaudid given 1015, 1234, 7360 with relief from some of the discomfort ( pain 7/4 on 0-10 scale). Up to bathroom with SBA- voided x2 thus far today- approx 150cc each time. Bed bath given. Call received from [facility] # [phone] ~ [phone] (ex 3)- stating patient is current with them. Updates given. Patient from home with her 26 y/o triplets. Per patient, she has a vast support system at home. Problem: Patient/Family Goals  Goal: Patient/Family Long Term Goal  Description: Patient's Long Term Goal: Pain will be decreased to 3-4 on scale 0-10. Interventions:  - Pain medication    Relaxation techniques including meditation. Repositioning self.     Ambulating hallway  - See additional Care Plan goals for specific interventions  Outcome: Progressing  Goal: Patient/Family Short Term Goal  Description: Patient's Short Term Goal: tolerate clear liquids    Interventions:   - attempt to consume small amounts of clear liquid diet while sitting in chair    Take antiemetics as needed  - See additional Care Plan goals for specific interventions  Outcome: Burgess Stone, RN, BSN   [phone]

## 2022-01-23 LAB
ALBUMIN SERPL-MCNC: 2.1 G/DL (ref 3.4–5)
ALBUMIN/GLOB SERPL: 0.6 {RATIO} (ref 1–2)
ALP LIVER SERPL-CCNC: 600 U/L
ALT SERPL-CCNC: 80 U/L
ANION GAP SERPL CALC-SCNC: 1 MMOL/L (ref 0–18)
AST SERPL-CCNC: 85 U/L (ref 15–37)
BASOPHILS # BLD AUTO: 0.02 X10(3) UL (ref 0–0.2)
BASOPHILS NFR BLD AUTO: 0.4 %
BILIRUB SERPL-MCNC: 0.6 MG/DL (ref 0.1–2)
BUN BLD-MCNC: 5 MG/DL (ref 7–18)
CALCIUM BLD-MCNC: 8.2 MG/DL (ref 8.5–10.1)
CHLORIDE SERPL-SCNC: 105 MMOL/L (ref 98–112)
CO2 SERPL-SCNC: 31 MMOL/L (ref 21–32)
CREAT BLD-MCNC: 0.33 MG/DL
EOSINOPHIL # BLD AUTO: 0.08 X10(3) UL (ref 0–0.7)
EOSINOPHIL NFR BLD AUTO: 1.5 %
ERYTHROCYTE [DISTWIDTH] IN BLOOD BY AUTOMATED COUNT: 14.5 %
F-ACTIN (SMOOTH MUSCLE) AB: 13 UNITS
GLOBULIN PLAS-MCNC: 3.5 G/DL (ref 2.8–4.4)
GLUCOSE BLD-MCNC: 109 MG/DL (ref 70–99)
GLUCOSE BLD-MCNC: 124 MG/DL (ref 70–99)
HCT VFR BLD AUTO: 30 %
HGB BLD-MCNC: 9.2 G/DL
IMM GRANULOCYTES # BLD AUTO: 0.02 X10(3) UL (ref 0–1)
IMM GRANULOCYTES NFR BLD: 0.4 %
LYMPHOCYTES # BLD AUTO: 1.21 X10(3) UL (ref 1–4)
LYMPHOCYTES NFR BLD AUTO: 22.7 %
MAGNESIUM SERPL-MCNC: 1.9 MG/DL (ref 1.6–2.6)
MCH RBC QN AUTO: 28.9 PG (ref 26–34)
MCHC RBC AUTO-ENTMCNC: 30.7 G/DL (ref 31–37)
MCV RBC AUTO: 94.3 FL
MITOCHONDRIAL M2 AB, IGG: 5.7 UNITS
MONOCYTES # BLD AUTO: 0.41 X10(3) UL (ref 0.1–1)
MONOCYTES NFR BLD AUTO: 7.7 %
NEUTROPHILS # BLD AUTO: 3.6 X10 (3) UL (ref 1.5–7.7)
NEUTROPHILS # BLD AUTO: 3.6 X10(3) UL (ref 1.5–7.7)
NEUTROPHILS NFR BLD AUTO: 67.3 %
OSMOLALITY SERPL CALC.SUM OF ELEC: 282 MOSM/KG (ref 275–295)
PHOSPHATE SERPL-MCNC: 3.2 MG/DL (ref 2.5–4.9)
PLATELET # BLD AUTO: 92 10(3)UL (ref 150–450)
POTASSIUM SERPL-SCNC: 3.6 MMOL/L (ref 3.5–5.1)
POTASSIUM SERPL-SCNC: 3.6 MMOL/L (ref 3.5–5.1)
PROT SERPL-MCNC: 5.6 G/DL (ref 6.4–8.2)
RBC # BLD AUTO: 3.18 X10(6)UL
SODIUM SERPL-SCNC: 137 MMOL/L (ref 136–145)
TRIGL SERPL-MCNC: 57 MG/DL (ref 30–149)

## 2022-01-23 PROCEDURE — 99233 SBSQ HOSP IP/OBS HIGH 50: CPT | Performed by: HOSPITALIST

## 2022-01-23 RX ORDER — ENOXAPARIN SODIUM 100 MG/ML
1 INJECTION SUBCUTANEOUS EVERY 12 HOURS SCHEDULED
Status: DISCONTINUED | OUTPATIENT
Start: 2022-01-23 | End: 2022-01-23

## 2022-01-23 RX ORDER — ENOXAPARIN SODIUM 100 MG/ML
1 INJECTION SUBCUTANEOUS EVERY 12 HOURS SCHEDULED
Status: DISCONTINUED | OUTPATIENT
Start: 2022-01-24 | End: 2022-01-23

## 2022-01-23 RX ORDER — HYDROMORPHONE HYDROCHLORIDE 1 MG/ML
0.5 INJECTION, SOLUTION INTRAMUSCULAR; INTRAVENOUS; SUBCUTANEOUS EVERY 4 HOURS PRN
Status: DISCONTINUED | OUTPATIENT
Start: 2022-01-23 | End: 2022-01-24

## 2022-01-23 RX ORDER — ENOXAPARIN SODIUM 100 MG/ML
1 INJECTION SUBCUTANEOUS EVERY 12 HOURS SCHEDULED
Status: DISCONTINUED | OUTPATIENT
Start: 2022-01-23 | End: 2022-02-01

## 2022-01-23 NOTE — PROGRESS NOTES
Assumed patient care at 0730  Pt alert and orientated x 4 vss on room air   Complaints of mid abdominal and back pain   Pt states her pain is relieved more with IV dilaudid   This RN did administer oxycodone via her J-tube with minimal relief  J-tube leaking bile drainage which is causing skin irration   Applied barrier cream and dressing changed x 2   Pt is nauseous receiving scheduled zofran and PRN compazine   Patient states the compazine helps her more with her nausea. 1700- patient states she is having left arm pain and swelling.    Dr Britney Bernal notified   Order received to give lovenox dose now and continue to monitor   Patient had US of left arm on 1/21 which was negative

## 2022-01-23 NOTE — PLAN OF CARE
Assumed care at 1900. A/O x4. RA. NSR to ST on tele. C/O back & upper thigh pain. Dilaudid given q2hr PRN. J tube leaking green output. Surrounding skin looks irritated & red. Site around J tube washed w/ soap and water & barrier cream applied. TPN started around 2100. About 150cc of serosanguineous emesis. Scheduled Compazine & Zofran given. Pt needs met. Pt currently resting in bed w/ call light within reach. Will continue to monitor.

## 2022-01-23 NOTE — PROGRESS NOTES
Patient states after she voided she noticed a small amount of blood on the toilet paper as she was wiping. Patient wiped again and no further blood was noted on tissue. Approx 125cc of clear emesis noted. Compazine given as requested for Nausea/Vomiting. Dilaudid 0.5 mg given for general body pain rated at 8. Green Drainage noted from J-tube site- small to moderate amount. Dressing changed x2. Relief noted from pain approx 5 minutes post administration of Dilaudid. Rapid Covid test completed and sent to Lab.     Tyra Moseley RN, BSN   [phone]

## 2022-01-24 ENCOUNTER — APPOINTMENT (OUTPATIENT)
Dept: GENERAL RADIOLOGY | Facility: HOSPITAL | Age: 57
DRG: 375 | End: 2022-01-24
Attending: INTERNAL MEDICINE
Payer: COMMERCIAL

## 2022-01-24 ENCOUNTER — ANESTHESIA EVENT (OUTPATIENT)
Dept: ENDOSCOPY | Facility: HOSPITAL | Age: 57
DRG: 375 | End: 2022-01-24
Payer: COMMERCIAL

## 2022-01-24 ENCOUNTER — APPOINTMENT (OUTPATIENT)
Dept: CT IMAGING | Facility: HOSPITAL | Age: 57
DRG: 375 | End: 2022-01-24
Attending: INTERNAL MEDICINE
Payer: COMMERCIAL

## 2022-01-24 LAB
ALBUMIN SERPL-MCNC: 2.1 G/DL (ref 3.4–5)
ALBUMIN/GLOB SERPL: 0.6 {RATIO} (ref 1–2)
ALP LIVER SERPL-CCNC: 752 U/L
ALT SERPL-CCNC: 67 U/L
ANA SER QL: NEGATIVE
ANION GAP SERPL CALC-SCNC: 2 MMOL/L (ref 0–18)
AST SERPL-CCNC: 63 U/L (ref 15–37)
BILIRUB SERPL-MCNC: 0.4 MG/DL (ref 0.1–2)
BUN BLD-MCNC: 8 MG/DL (ref 7–18)
CALCIUM BLD-MCNC: 7.9 MG/DL (ref 8.5–10.1)
CHLORIDE SERPL-SCNC: 105 MMOL/L (ref 98–112)
CO2 SERPL-SCNC: 31 MMOL/L (ref 21–32)
CREAT BLD-MCNC: 0.3 MG/DL
EBV NA IGG SER QL IA: POSITIVE
EBV VCA IGG SER QL IA: POSITIVE
EBV VCA IGM SER QL IA: NEGATIVE
GLOBULIN PLAS-MCNC: 3.5 G/DL (ref 2.8–4.4)
GLUCOSE BLD-MCNC: 104 MG/DL (ref 70–99)
GLUCOSE BLD-MCNC: 109 MG/DL (ref 70–99)
GLUCOSE BLD-MCNC: 111 MG/DL (ref 70–99)
GLUCOSE BLD-MCNC: 113 MG/DL (ref 70–99)
GLUCOSE BLD-MCNC: 154 MG/DL (ref 70–99)
MAGNESIUM SERPL-MCNC: 2 MG/DL (ref 1.6–2.6)
OSMOLALITY SERPL CALC.SUM OF ELEC: 285 MOSM/KG (ref 275–295)
PHOSPHATE SERPL-MCNC: 4.1 MG/DL (ref 2.5–4.9)
POTASSIUM SERPL-SCNC: 3.4 MMOL/L (ref 3.5–5.1)
PROT SERPL-MCNC: 5.6 G/DL (ref 6.4–8.2)
SODIUM SERPL-SCNC: 138 MMOL/L (ref 136–145)

## 2022-01-24 PROCEDURE — 74018 RADEX ABDOMEN 1 VIEW: CPT | Performed by: INTERNAL MEDICINE

## 2022-01-24 PROCEDURE — 74177 CT ABD & PELVIS W/CONTRAST: CPT | Performed by: INTERNAL MEDICINE

## 2022-01-24 PROCEDURE — 99233 SBSQ HOSP IP/OBS HIGH 50: CPT | Performed by: HOSPITALIST

## 2022-01-24 RX ORDER — OXYCODONE HYDROCHLORIDE AND ACETAMINOPHEN 5; 325 MG/1; MG/1
1 TABLET ORAL EVERY 6 HOURS
Status: DISCONTINUED | OUTPATIENT
Start: 2022-01-24 | End: 2022-02-01

## 2022-01-24 RX ORDER — OXYCODONE HCL 5 MG/5 ML
10 SOLUTION, ORAL ORAL EVERY 4 HOURS PRN
Status: DISCONTINUED | OUTPATIENT
Start: 2022-01-24 | End: 2022-02-01

## 2022-01-24 RX ORDER — OXYCODONE HCL 5 MG/5 ML
5 SOLUTION, ORAL ORAL EVERY 4 HOURS PRN
Status: DISCONTINUED | OUTPATIENT
Start: 2022-01-24 | End: 2022-01-24

## 2022-01-24 RX ORDER — IOHEXOL 350 MG/ML
65 INJECTION, SOLUTION INTRAVENOUS
Status: COMPLETED | OUTPATIENT
Start: 2022-01-24 | End: 2022-01-24

## 2022-01-24 RX ORDER — HYDROMORPHONE HYDROCHLORIDE 1 MG/ML
0.8 INJECTION, SOLUTION INTRAMUSCULAR; INTRAVENOUS; SUBCUTANEOUS
Status: DISCONTINUED | OUTPATIENT
Start: 2022-01-24 | End: 2022-02-01

## 2022-01-24 NOTE — ANESTHESIA PREPROCEDURE EVALUATION
PRE-OP EVALUATION    Patient Name: Taiwo Lam    Admit Diagnosis: Malignant neoplasm of stomach, unspecified location [facility]) [C16.9]  Malignant neoplasm of stomach, unspecified location [facility])    Pre-op Diagnosis: Gastric adenocarcinoma ([facility] [Z46 HYDROmorphone HCl (DILAUDID) 1 MG/ML injection 0.5 mg, 0.5 mg, Intravenous, Once  [] HYDROmorphone HCl (DILAUDID) 1 MG/ML injection, , ,   [] HYDROmorphone HCl (DILAUDID) 1 MG/ML injection, , ,   temazepam (RESTORIL) cap 15 mg, 15 mg, Oral, N Vitamins-Minerals (MULTI-DAY PLUS MINERALS) Oral Tab, Take 1 tablet by mouth daily.   , Disp: , Rfl: , 1/19/2022 at Unknown time  temazepam 15 MG Oral Cap, Take 15 mg by mouth nightly as needed for Sleep., Disp: , Rfl: , Past Week at Unknown time  [place] Tobacco Use      Smoking status: Never Smoker      Smokeless tobacco: Never Used    Alcohol use: No      Drug use: No     Available pre-op labs reviewed.   Lab Results   Component Value Date    WBC 5.3 01/23/2022    RBC 3.18 (L) 01/23/2022    HGB 9.2 (L) 0

## 2022-01-24 NOTE — CM/SW NOTE
Pt discussed in rounds this AM. SW did chart review. Pt has hx with [facility] for tube feeds and [facility] for [facility]. Updates sent to [facility] for tube feeds. Awaiting recommendations.      Gladis 106, LSW

## 2022-01-24 NOTE — PLAN OF CARE
Received patient at 1, sitting up in bed, c/o pain to back which radiates to abdomen. Pain meds ATC. J-tube leaking bile around skin, dressing changes ATC, skin reddened, applying barrier cream with dressing changes.

## 2022-01-25 ENCOUNTER — APPOINTMENT (OUTPATIENT)
Dept: GENERAL RADIOLOGY | Facility: HOSPITAL | Age: 57
DRG: 375 | End: 2022-01-25
Attending: INTERNAL MEDICINE
Payer: COMMERCIAL

## 2022-01-25 ENCOUNTER — ANESTHESIA (OUTPATIENT)
Dept: ENDOSCOPY | Facility: HOSPITAL | Age: 57
DRG: 375 | End: 2022-01-25
Payer: COMMERCIAL

## 2022-01-25 LAB
ALBUMIN SERPL-MCNC: 2.2 G/DL (ref 3.4–5)
ALBUMIN/GLOB SERPL: 0.6 {RATIO} (ref 1–2)
ALP LIVER SERPL-CCNC: 994 U/L
ALT SERPL-CCNC: 55 U/L
AST SERPL-CCNC: 41 U/L (ref 15–37)
BILIRUB SERPL-MCNC: 0.4 MG/DL (ref 0.1–2)
BUN BLD-MCNC: 13 MG/DL (ref 7–18)
CALCIUM BLD-MCNC: 8.2 MG/DL (ref 8.5–10.1)
CHLORIDE SERPL-SCNC: 107 MMOL/L (ref 98–112)
CO2 SERPL-SCNC: 29 MMOL/L (ref 21–32)
CREAT BLD-MCNC: 0.37 MG/DL
GLOBULIN PLAS-MCNC: 3.7 G/DL (ref 2.8–4.4)
GLUCOSE BLD-MCNC: 105 MG/DL (ref 70–99)
GLUCOSE BLD-MCNC: 113 MG/DL (ref 70–99)
GLUCOSE BLD-MCNC: 137 MG/DL (ref 70–99)
GLUCOSE BLD-MCNC: 154 MG/DL (ref 70–99)
MAGNESIUM SERPL-MCNC: 2.1 MG/DL (ref 1.6–2.6)
OSMOLALITY SERPL CALC.SUM OF ELEC: 290 MOSM/KG (ref 275–295)
PHOSPHATE SERPL-MCNC: 3.5 MG/DL (ref 2.5–4.9)
POTASSIUM SERPL-SCNC: 3.7 MMOL/L (ref 3.5–5.1)
POTASSIUM SERPL-SCNC: 3.7 MMOL/L (ref 3.5–5.1)
PROT SERPL-MCNC: 5.9 G/DL (ref 6.4–8.2)
SODIUM SERPL-SCNC: 140 MMOL/L (ref 136–145)

## 2022-01-25 PROCEDURE — 99232 SBSQ HOSP IP/OBS MODERATE 35: CPT | Performed by: HOSPITALIST

## 2022-01-25 PROCEDURE — 0D7A8DZ DILATION OF JEJUNUM WITH INTRALUMINAL DEVICE, VIA NATURAL OR ARTIFICIAL OPENING ENDOSCOPIC: ICD-10-PCS | Performed by: INTERNAL MEDICINE

## 2022-01-25 DEVICE — STENT SYSTEM WITH ANCHOR LOCK DELIVERY SYSTEM
Type: IMPLANTABLE DEVICE | Site: DUODENAL | Status: FUNCTIONAL
Brand: WALLFLEX™ DUODENAL

## 2022-01-25 RX ORDER — ACETAMINOPHEN 500 MG
1000 TABLET ORAL ONCE AS NEEDED
Status: DISCONTINUED | OUTPATIENT
Start: 2022-01-25 | End: 2022-01-25 | Stop reason: HOSPADM

## 2022-01-25 RX ORDER — HYDROMORPHONE HYDROCHLORIDE 1 MG/ML
0.4 INJECTION, SOLUTION INTRAMUSCULAR; INTRAVENOUS; SUBCUTANEOUS EVERY 5 MIN PRN
Status: DISCONTINUED | OUTPATIENT
Start: 2022-01-25 | End: 2022-01-25 | Stop reason: HOSPADM

## 2022-01-25 RX ORDER — SODIUM CHLORIDE, SODIUM LACTATE, POTASSIUM CHLORIDE, CALCIUM CHLORIDE 600; 310; 30; 20 MG/100ML; MG/100ML; MG/100ML; MG/100ML
INJECTION, SOLUTION INTRAVENOUS CONTINUOUS
Status: CANCELLED | OUTPATIENT
Start: 2022-01-25

## 2022-01-25 RX ORDER — NALOXONE HYDROCHLORIDE 0.4 MG/ML
80 INJECTION, SOLUTION INTRAMUSCULAR; INTRAVENOUS; SUBCUTANEOUS AS NEEDED
Status: DISCONTINUED | OUTPATIENT
Start: 2022-01-25 | End: 2022-01-25 | Stop reason: HOSPADM

## 2022-01-25 RX ORDER — LIDOCAINE HYDROCHLORIDE 10 MG/ML
INJECTION, SOLUTION EPIDURAL; INFILTRATION; INTRACAUDAL; PERINEURAL AS NEEDED
Status: DISCONTINUED | OUTPATIENT
Start: 2022-01-25 | End: 2022-01-25 | Stop reason: SURG

## 2022-01-25 RX ORDER — ONDANSETRON 2 MG/ML
INJECTION INTRAMUSCULAR; INTRAVENOUS AS NEEDED
Status: DISCONTINUED | OUTPATIENT
Start: 2022-01-25 | End: 2022-01-25 | Stop reason: SURG

## 2022-01-25 RX ORDER — NALOXONE HYDROCHLORIDE 0.4 MG/ML
80 INJECTION, SOLUTION INTRAMUSCULAR; INTRAVENOUS; SUBCUTANEOUS AS NEEDED
Status: CANCELLED | OUTPATIENT
Start: 2022-01-25 | End: 2022-01-25

## 2022-01-25 RX ORDER — ONDANSETRON 2 MG/ML
4 INJECTION INTRAMUSCULAR; INTRAVENOUS AS NEEDED
Status: DISCONTINUED | OUTPATIENT
Start: 2022-01-25 | End: 2022-01-25 | Stop reason: HOSPADM

## 2022-01-25 RX ORDER — ONDANSETRON 2 MG/ML
INJECTION INTRAMUSCULAR; INTRAVENOUS
Status: COMPLETED
Start: 2022-01-25 | End: 2022-01-25

## 2022-01-25 RX ORDER — MIDAZOLAM HYDROCHLORIDE 1 MG/ML
1 INJECTION INTRAMUSCULAR; INTRAVENOUS EVERY 5 MIN PRN
Status: DISCONTINUED | OUTPATIENT
Start: 2022-01-25 | End: 2022-01-25 | Stop reason: HOSPADM

## 2022-01-25 RX ORDER — HYDROCODONE BITARTRATE AND ACETAMINOPHEN 5; 325 MG/1; MG/1
2 TABLET ORAL AS NEEDED
Status: DISCONTINUED | OUTPATIENT
Start: 2022-01-25 | End: 2022-01-25 | Stop reason: HOSPADM

## 2022-01-25 RX ORDER — HYDROCODONE BITARTRATE AND ACETAMINOPHEN 5; 325 MG/1; MG/1
1 TABLET ORAL AS NEEDED
Status: DISCONTINUED | OUTPATIENT
Start: 2022-01-25 | End: 2022-01-25 | Stop reason: HOSPADM

## 2022-01-25 RX ORDER — HYDROMORPHONE HYDROCHLORIDE 1 MG/ML
INJECTION, SOLUTION INTRAMUSCULAR; INTRAVENOUS; SUBCUTANEOUS
Status: COMPLETED
Start: 2022-01-25 | End: 2022-01-25

## 2022-01-25 RX ORDER — SODIUM CHLORIDE, SODIUM LACTATE, POTASSIUM CHLORIDE, CALCIUM CHLORIDE 600; 310; 30; 20 MG/100ML; MG/100ML; MG/100ML; MG/100ML
INJECTION, SOLUTION INTRAVENOUS CONTINUOUS
Status: DISCONTINUED | OUTPATIENT
Start: 2022-01-25 | End: 2022-01-25 | Stop reason: HOSPADM

## 2022-01-25 RX ORDER — DIPHENHYDRAMINE HYDROCHLORIDE 50 MG/ML
12.5 INJECTION INTRAMUSCULAR; INTRAVENOUS AS NEEDED
Status: DISCONTINUED | OUTPATIENT
Start: 2022-01-25 | End: 2022-01-25 | Stop reason: HOSPADM

## 2022-01-25 RX ORDER — SODIUM CHLORIDE, SODIUM LACTATE, POTASSIUM CHLORIDE, CALCIUM CHLORIDE 600; 310; 30; 20 MG/100ML; MG/100ML; MG/100ML; MG/100ML
INJECTION, SOLUTION INTRAVENOUS CONTINUOUS
Status: DISCONTINUED | OUTPATIENT
Start: 2022-01-25 | End: 2022-01-27

## 2022-01-25 RX ORDER — DEXAMETHASONE SODIUM PHOSPHATE 4 MG/ML
VIAL (ML) INJECTION AS NEEDED
Status: DISCONTINUED | OUTPATIENT
Start: 2022-01-25 | End: 2022-01-25 | Stop reason: SURG

## 2022-01-25 RX ORDER — MEPERIDINE HYDROCHLORIDE 25 MG/ML
12.5 INJECTION INTRAMUSCULAR; INTRAVENOUS; SUBCUTANEOUS AS NEEDED
Status: DISCONTINUED | OUTPATIENT
Start: 2022-01-25 | End: 2022-01-25 | Stop reason: HOSPADM

## 2022-01-25 RX ADMIN — DEXAMETHASONE SODIUM PHOSPHATE 4 MG: 4 MG/ML VIAL (ML) INJECTION at 09:30:00

## 2022-01-25 RX ADMIN — ONDANSETRON 4 MG: 2 INJECTION INTRAMUSCULAR; INTRAVENOUS at 09:30:00

## 2022-01-25 RX ADMIN — LIDOCAINE HYDROCHLORIDE 100 MG: 10 INJECTION, SOLUTION EPIDURAL; INFILTRATION; INTRACAUDAL; PERINEURAL at 08:29:00

## 2022-01-25 NOTE — ANESTHESIA POSTPROCEDURE EVALUATION
[facility] Patient Status:  Inpatient   Age/Gender 61year old female MRN [id number]   Location [address] [facility] Attending Rudolph Raines MD   Hosp Day # 5 PCP Garo Wakefield MD       Anesthesia Po

## 2022-01-25 NOTE — PLAN OF CARE
Received patient at [address] [place] [address], lying in bed with lights off but arousable. Patient reports that she has no pain at the present moment. Dressing around J-tube dry for now. Will continue to follow and monitor pain levels throughout the night.

## 2022-01-25 NOTE — PLAN OF CARE
Patient down for xray at 2100, returned around 2130 with J-tube leaking and patient complaining of pain with increased nausea; zofran given. Will continue to follow.

## 2022-01-25 NOTE — PLAN OF CARE
Assumed care @ 4528  CT abd completed. Dr. Chemo Cuadra notified of results  J tube leaking at site. Dressing changed multiple times  Scheduled zofran and prn compazine given for c/o nausea with small amt of mucous emesis  C/o lower back pain. Percocet scheduled.  Oxycodone and IV dilaudid given    Plan for J tube check and possible EGD tomorrow

## 2022-01-25 NOTE — PLAN OF CARE
Assumed care @ 0730  EGD completed. Pt with bloody emesis. Dr. Lawrence Sheffield aware  Pt states unable to tolerate po contrast for UGI. Dr. Lawrence Sheffield updated. Plan to re-attempt tomorrow  Scheduled percocet and prn oxy given for c/o back pain rated 7/10  Mother updated on POC via phone    1600 - Pt with continued bright red bloody emesis. ST with -120 at rest. SpO2 93% on 5L NC. Temp 102. 9. pt c/o palpitations and rigors. Dr. Maru Shields and Dr. Billie Jo updated. BC, IV abx, IVF bolus and continuous fluids ordered.  zofran and compazine given

## 2022-01-25 NOTE — ANESTHESIA PROCEDURE NOTES
Airway  Date/Time: 1/25/2022 9:27 AM  Urgency: elective      General Information and Staff    Patient location during procedure: OR  Anesthesiologist: Anibal Chanel MD  Performed: anesthesiologist     Indications and Patient Condition  Indications for

## 2022-01-26 ENCOUNTER — APPOINTMENT (OUTPATIENT)
Dept: INTERVENTIONAL RADIOLOGY/VASCULAR | Facility: HOSPITAL | Age: 57
DRG: 375 | End: 2022-01-26
Attending: PHYSICIAN ASSISTANT
Payer: COMMERCIAL

## 2022-01-26 LAB
ALBUMIN SERPL-MCNC: 1.7 G/DL (ref 3.4–5)
ALBUMIN/GLOB SERPL: 0.5 {RATIO} (ref 1–2)
ALP LIVER SERPL-CCNC: 687 U/L
ALT SERPL-CCNC: 37 U/L
ANION GAP SERPL CALC-SCNC: 5 MMOL/L (ref 0–18)
AST SERPL-CCNC: 25 U/L (ref 15–37)
BASOPHILS # BLD AUTO: 0.01 X10(3) UL (ref 0–0.2)
BASOPHILS NFR BLD AUTO: 0.1 %
BILIRUB SERPL-MCNC: 0.4 MG/DL (ref 0.1–2)
BUN BLD-MCNC: 12 MG/DL (ref 7–18)
CALCIUM BLD-MCNC: 8.1 MG/DL (ref 8.5–10.1)
CHLORIDE SERPL-SCNC: 106 MMOL/L (ref 98–112)
CO2 SERPL-SCNC: 27 MMOL/L (ref 21–32)
CREAT BLD-MCNC: 0.32 MG/DL
EOSINOPHIL # BLD AUTO: 0.02 X10(3) UL (ref 0–0.7)
EOSINOPHIL NFR BLD AUTO: 0.2 %
ERYTHROCYTE [DISTWIDTH] IN BLOOD BY AUTOMATED COUNT: 15.1 %
GLOBULIN PLAS-MCNC: 3.6 G/DL (ref 2.8–4.4)
GLUCOSE BLD-MCNC: 162 MG/DL (ref 70–99)
GLUCOSE BLD-MCNC: 163 MG/DL (ref 70–99)
GLUCOSE BLD-MCNC: 187 MG/DL (ref 70–99)
GLUCOSE BLD-MCNC: 213 MG/DL (ref 70–99)
GLUCOSE BLD-MCNC: 86 MG/DL (ref 70–99)
HCT VFR BLD AUTO: 27 %
HGB BLD-MCNC: 8.6 G/DL
HGB BLD-MCNC: 8.8 G/DL
HGB BLD-MCNC: 9 G/DL
HGB BLD-MCNC: 9 G/DL
HHV6 DNA, QUANT INTERP: NOT DETECTED
HHV6 QUANT (COPY/ML): <1000 CPY/ML
HHV6 QUANT (LOG COPY/ML): <3 LOG
IMM GRANULOCYTES # BLD AUTO: 0.05 X10(3) UL (ref 0–1)
IMM GRANULOCYTES NFR BLD: 0.5 %
LYMPHOCYTES # BLD AUTO: 1.07 X10(3) UL (ref 1–4)
LYMPHOCYTES NFR BLD AUTO: 10.5 %
MAGNESIUM SERPL-MCNC: 1.8 MG/DL (ref 1.6–2.6)
MCH RBC QN AUTO: 30.1 PG (ref 26–34)
MCHC RBC AUTO-ENTMCNC: 31.9 G/DL (ref 31–37)
MCV RBC AUTO: 94.4 FL
MONOCYTES # BLD AUTO: 0.7 X10(3) UL (ref 0.1–1)
MONOCYTES NFR BLD AUTO: 6.9 %
NEUTROPHILS # BLD AUTO: 8.35 X10 (3) UL (ref 1.5–7.7)
NEUTROPHILS # BLD AUTO: 8.35 X10(3) UL (ref 1.5–7.7)
NEUTROPHILS NFR BLD AUTO: 81.8 %
OSMOLALITY SERPL CALC.SUM OF ELEC: 289 MOSM/KG (ref 275–295)
PHOSPHATE SERPL-MCNC: 2 MG/DL (ref 2.5–4.9)
POTASSIUM SERPL-SCNC: 3.7 MMOL/L (ref 3.5–5.1)
PROT SERPL-MCNC: 5.3 G/DL (ref 6.4–8.2)
RBC # BLD AUTO: 2.86 X10(6)UL
SODIUM SERPL-SCNC: 138 MMOL/L (ref 136–145)
VANCOMYCIN PEAK SERPL-MCNC: 16 UG/ML (ref 30–50)
WBC # BLD AUTO: 10.2 X10(3) UL (ref 4–11)

## 2022-01-26 PROCEDURE — 99232 SBSQ HOSP IP/OBS MODERATE 35: CPT | Performed by: INTERNAL MEDICINE

## 2022-01-26 PROCEDURE — 0D2DXUZ CHANGE FEEDING DEVICE IN LOWER INTESTINAL TRACT, EXTERNAL APPROACH: ICD-10-PCS | Performed by: RADIOLOGY

## 2022-01-26 RX ORDER — LIDOCAINE HYDROCHLORIDE 10 MG/ML
INJECTION, SOLUTION INFILTRATION; PERINEURAL
Status: COMPLETED
Start: 2022-01-26 | End: 2022-01-26

## 2022-01-26 RX ORDER — MIDAZOLAM HYDROCHLORIDE 1 MG/ML
INJECTION INTRAMUSCULAR; INTRAVENOUS
Status: COMPLETED
Start: 2022-01-26 | End: 2022-01-26

## 2022-01-26 RX ORDER — VANCOMYCIN HYDROCHLORIDE
25 ONCE
Status: COMPLETED | OUTPATIENT
Start: 2022-01-26 | End: 2022-01-26

## 2022-01-26 NOTE — PROGRESS NOTES
[address] [place] [address]    Initial Pharmacokinetic Consult for Vancomycin AUC Dosing    Jo Ann Smoker is a 61year old patient who is being treated for bacteremia. Pharmacy has been asked to dose vancomycin by Dr Marco Skaggs. Weights:  Ideal body weight: 54.7 kg (120 lb 9.5 oz)  Adjusted ideal body weight: 54.9 kg (120 lb 15.3 oz)  Actual weight:  55.1 kg (121 lb 8 oz)    Labs:  Lab Results   Component Value Date    CREATSERUM 0.32 01/26/2022      CrCl:  Estimated Creatinine Clearance: 167.5 mL/min (A) (based on SCr of 0.32 mg/dL (L)). Based on the above:    1. This patient will receive a loading dose of Vancomycin  1500 mg IVPB (25mg/kg, capped at 2000 mg) x 1 dose. Vancomycin 750mg(15mg/kg)every 12 hours has been ordered and placed on hold pending first dose AUC levels      2. Vancomycin peak and trough will be obtained prior to the next dose, in order to calculate AUC24. Goal AUC24 is 400-600 mg-h/L.     3. Pharmacy will order SCr as clinically indicated while on vancomycin to assess renal function. 4. Pharmacy will follow and monitor renal function, toxicity and efficacy. We appreciate the opportunity to assist in the care of this patient.     Nani Rice PharmD  1/26/2022  3:10 PM  [address] [place] [address] Extension: [phone]

## 2022-01-26 NOTE — CM/SW NOTE
[facility] updated that tube feeds will resume, TPN only given in hosp. Per RN TPN will end at [facility].  Updates sent in [facility]\""

## 2022-01-26 NOTE — PLAN OF CARE
I assumed care of this patient at 0730. Pt was AAOx4, on 3lpm via NC O2 sat 98%, NSR, and afebrile. Pt denies any needs at this time. Call light is within reach.  Triple Lumen PICC infusing TPN/LR/NS

## 2022-01-26 NOTE — PROCEDURES
[facility]  Procedure Note    Sarah Peck Patient Status:  Inpatient    3/5/1965 MRN [id number]   [facility] 7NE-A Attending Louann Del Valle MD   Hosp Day # 6 PCP Isha De La Cruz MD     Procedure: Jejunostomy tube placement    Pre-Procedure Diagnosis:  Withdrawn jejunostomy    Post-Procedure Diagnosis: same    Anesthesia:  Local and Sedation    Findings:  Tip in jejunum    Specimens: none    Blood Loss:  <5 ml    Tourniquet Time: n/a  Complications:  None  Drains:  18 Fr DON jejunostomy    Secondary Diagnosis:  none    Beatrice Vasquez MD  2022

## 2022-01-26 NOTE — PLAN OF CARE
Assumed care at 1. A&OX4. Tele - SR/ST. Continues to have back & abdominal pain. Medicated with Percocet & Dilaudid with moderate pain control. O2 4L per NC with adequate sats. Lungs diminished with faint crackles. Continues to spit up frothy blood tinged sputum. Resting comfortably. Will continue to monitor. Problem: Patient/Family Goals  Goal: Patient/Family Long Term Goal  Description: Patient's Long Term Goal: Pain will be decreased to 3-4 on scale 0-10. Interventions:  - Pain medication    Relaxation techniques including meditation. Repositioning self.     Ambulating hallway  - See additional Care Plan goals for specific interventions  Outcome: Progressing  Goal: Patient/Family Short Term Goal  Description: Patient's Short Term Goal: tolerate clear liquids    Interventions:   - attempt to consume small amounts of clear liquid diet while sitting in chair    Take antiemetics as needed  - See additional Care Plan goals for specific interventions  Outcome: Progressing     Problem: GASTROINTESTINAL - ADULT  Goal: Minimal or absence of nausea and vomiting  Description: INTERVENTIONS:  - Maintain adequate hydration with IV or PO as ordered and tolerated  - Nasogastric tube to low intermittent suction as ordered  - Evaluate effectiveness of ordered antiemetic medications  - Provide nonpharmacologic comfort measures as appropriate  - Advance diet as tolerated, if ordered  - Obtain nutritional consult as needed  - Evaluate fluid balance  Outcome: Progressing     Problem: RISK FOR INFECTION - ADULT  Goal: Absence of fever/infection during anticipated neutropenic period  Description: INTERVENTIONS  - Monitor WBC  - Administer growth factors as ordered  - Implement neutropenic guidelines  Outcome: Progressing     Problem: SAFETY ADULT - FALL  Goal: Free from fall injury  Description: INTERVENTIONS:  - Assess pt frequently for physical needs  - Identify cognitive and physical deficits and behaviors that affect risk of falls.   - [place] fall precautions as indicated by assessment.  - Educate pt/family on patient safety including physical limitations  - Instruct pt to call for assistance with activity based on assessment  - Modify environment to reduce risk of injury  - Provide assistive devices as appropriate  - Consider OT/PT consult to assist with strengthening/mobility  - Encourage toileting schedule  Outcome: Progressing     Problem: DISCHARGE PLANNING  Goal: Discharge to home or other facility with appropriate resources  Description: INTERVENTIONS:  - Identify barriers to discharge w/pt and caregiver  - Include patient/family/discharge partner in discharge planning  - Arrange for needed discharge resources and transportation as appropriate  - Identify discharge learning needs (meds, wound care, etc)  - Arrange for interpreters to assist at discharge as needed  - Consider post-discharge preferences of patient/family/discharge partner  - Complete POLST form as appropriate  - Assess patient's ability to be responsible for managing their own health  - Refer to Case Management Department for coordinating discharge planning if the patient needs post-hospital services based on physician/LIP order or complex needs related to functional status, cognitive ability or social support system  Outcome: Progressing

## 2022-01-27 ENCOUNTER — APPOINTMENT (OUTPATIENT)
Dept: CV DIAGNOSTICS | Facility: HOSPITAL | Age: 57
DRG: 375 | End: 2022-01-27
Attending: PHYSICIAN ASSISTANT
Payer: COMMERCIAL

## 2022-01-27 LAB
ALBUMIN SERPL-MCNC: 1.7 G/DL (ref 3.4–5)
ALBUMIN/GLOB SERPL: 0.6 {RATIO} (ref 1–2)
ALP LIVER SERPL-CCNC: 503 U/L
ALT SERPL-CCNC: 28 U/L
ANION GAP SERPL CALC-SCNC: 2 MMOL/L (ref 0–18)
AST SERPL-CCNC: 17 U/L (ref 15–37)
BILIRUB SERPL-MCNC: 0.5 MG/DL (ref 0.1–2)
BUN BLD-MCNC: 8 MG/DL (ref 7–18)
CALCIUM BLD-MCNC: 7.7 MG/DL (ref 8.5–10.1)
CHLORIDE SERPL-SCNC: 107 MMOL/L (ref 98–112)
CO2 SERPL-SCNC: 27 MMOL/L (ref 21–32)
CREAT BLD-MCNC: 0.32 MG/DL
GLOBULIN PLAS-MCNC: 3 G/DL (ref 2.8–4.4)
GLUCOSE BLD-MCNC: 105 MG/DL (ref 70–99)
GLUCOSE BLD-MCNC: 108 MG/DL (ref 70–99)
GLUCOSE BLD-MCNC: 137 MG/DL (ref 70–99)
GLUCOSE BLD-MCNC: 148 MG/DL (ref 70–99)
GLUCOSE BLD-MCNC: 169 MG/DL (ref 70–99)
HGB BLD-MCNC: 8 G/DL
HGB BLD-MCNC: 8.3 G/DL
HGB BLD-MCNC: 9 G/DL
MAGNESIUM SERPL-MCNC: 1.7 MG/DL (ref 1.6–2.6)
OSMOLALITY SERPL CALC.SUM OF ELEC: 282 MOSM/KG (ref 275–295)
PHOSPHATE SERPL-MCNC: 1.9 MG/DL (ref 2.5–4.9)
POTASSIUM SERPL-SCNC: 4.2 MMOL/L (ref 3.5–5.1)
PROT SERPL-MCNC: 4.7 G/DL (ref 6.4–8.2)
SODIUM SERPL-SCNC: 136 MMOL/L (ref 136–145)
VANCOMYCIN TROUGH SERPL-MCNC: 9.4 UG/ML (ref 10–20)

## 2022-01-27 PROCEDURE — 99232 SBSQ HOSP IP/OBS MODERATE 35: CPT | Performed by: INTERNAL MEDICINE

## 2022-01-27 PROCEDURE — 93306 TTE W/DOPPLER COMPLETE: CPT | Performed by: PHYSICIAN ASSISTANT

## 2022-01-27 RX ORDER — HYDROMORPHONE HYDROCHLORIDE 1 MG/ML
0.8 INJECTION, SOLUTION INTRAMUSCULAR; INTRAVENOUS; SUBCUTANEOUS ONCE
Status: COMPLETED | OUTPATIENT
Start: 2022-01-27 | End: 2022-01-27

## 2022-01-27 RX ORDER — MAGNESIUM SULFATE HEPTAHYDRATE 40 MG/ML
2 INJECTION, SOLUTION INTRAVENOUS ONCE
Status: DISCONTINUED | OUTPATIENT
Start: 2022-01-27 | End: 2022-01-27

## 2022-01-27 RX ORDER — MAGNESIUM SULFATE HEPTAHYDRATE 40 MG/ML
2 INJECTION, SOLUTION INTRAVENOUS ONCE
Status: COMPLETED | OUTPATIENT
Start: 2022-01-27 | End: 2022-01-27

## 2022-01-27 NOTE — PLAN OF CARE
Assumed care at 1930  VSS- NSR on tele, RA  AOx4  J tube started leaking- copious green drainage, GI aware- no new orders  Dilaudid PRN for back pain with moderate relief  Continuing to spit up frothy, blood tinged sputum  No c/o nausea overnight  TPN, LR, abx infusing per order  Lovenox still on hold per GI  Pt resting comfortably  Echo ordered and to be completed this morning  Pt had mild nose bleed this morning at approx 0650- lasted a couple minutes, Dr. Bossman Morrell notified- no new orders  POC discussed with pt

## 2022-01-27 NOTE — CM/SW NOTE
Updates sent to [facility], aware that tpn will restart. DC tbd. Pt discussed in rounds this AM, pt resides in [facility], requesting transport services. Sw to look into this.      Gladis 106, LSW

## 2022-01-27 NOTE — PROGRESS NOTES
01/27/22 1046   Clinical Encounter Type   Visited With Patient; Health care provider  (When asked if patient wanted a , Nancytaz Cohen stated, \"not right now. \")   Routine Visit Introduction   Continue Visiting No  (unless requested)   Spiritual care remains available at pager [phone].

## 2022-01-28 LAB
ALBUMIN SERPL-MCNC: 1.7 G/DL (ref 3.4–5)
ALBUMIN/GLOB SERPL: 0.5 {RATIO} (ref 1–2)
ALP LIVER SERPL-CCNC: 660 U/L
ALT SERPL-CCNC: 24 U/L
ANION GAP SERPL CALC-SCNC: 7 MMOL/L (ref 0–18)
AST SERPL-CCNC: 23 U/L (ref 15–37)
BILIRUB SERPL-MCNC: 1.5 MG/DL (ref 0.1–2)
BUN BLD-MCNC: 10 MG/DL (ref 7–18)
CALCIUM BLD-MCNC: 7.4 MG/DL (ref 8.5–10.1)
CHLORIDE SERPL-SCNC: 104 MMOL/L (ref 98–112)
CO2 SERPL-SCNC: 26 MMOL/L (ref 21–32)
GLOBULIN PLAS-MCNC: 3.2 G/DL (ref 2.8–4.4)
GLUCOSE BLD-MCNC: 102 MG/DL (ref 70–99)
GLUCOSE BLD-MCNC: 127 MG/DL (ref 70–99)
GLUCOSE BLD-MCNC: 133 MG/DL (ref 70–99)
HGB BLD-MCNC: 9 G/DL
HGB BLD-MCNC: 9.2 G/DL
HGB BLD-MCNC: 9.6 G/DL
INR BLD: 1.12 (ref 0.8–1.2)
MAGNESIUM SERPL-MCNC: 2.4 MG/DL (ref 1.6–2.6)
OSMOLALITY SERPL CALC.SUM OF ELEC: 285 MOSM/KG (ref 275–295)
PHOSPHATE SERPL-MCNC: 4.3 MG/DL (ref 2.5–4.9)
POTASSIUM SERPL-SCNC: 4.7 MMOL/L (ref 3.5–5.1)
PROT SERPL-MCNC: 4.9 G/DL (ref 6.4–8.2)
PROTHROMBIN TIME: 14.4 SECONDS (ref 11.6–14.8)
SODIUM SERPL-SCNC: 137 MMOL/L (ref 136–145)
STAPHYLOCOCCUS AUREUS, MRSA BY PCR: DETECTED
VANCOMYCIN TROUGH SERPL-MCNC: 13.9 UG/ML (ref 10–20)

## 2022-01-28 PROCEDURE — 99232 SBSQ HOSP IP/OBS MODERATE 35: CPT | Performed by: INTERNAL MEDICINE

## 2022-01-28 PROCEDURE — 99255 IP/OBS CONSLTJ NEW/EST HI 80: CPT | Performed by: SURGERY

## 2022-01-28 NOTE — PLAN OF CARE
Assumed care at 0700  Pt AxOx4, SR/ST on tele, RA  Persistent bile leaking from j tube. Dressing changed numerous times. Desitin applied on skin for redness/irritation. Leaking slowed this evening. Given prn dilaudid and percocet for back and abd pain. Some relief noted. Worsened pain this evening. Continuous \"spitting up\". Frothy/white in appearance. MDs aware. Given scheduled zofran, prn compazine and prn ativan  Tube feeds resumed in afternoon per order  PICC line removed, tip sent for culture. Plan to begin PPN tonight. Extended PIV placed for access.   IVF stopped per order  Echo completed  Pt and friend updated on POC Patient's belongings returned

## 2022-01-28 NOTE — CM/SW NOTE
SW made aware pt will need iv abx once cleared for dc. Updates sent to [address] [place] [address] following.      Gladis 106, LSW

## 2022-01-29 ENCOUNTER — APPOINTMENT (OUTPATIENT)
Dept: INTERVENTIONAL RADIOLOGY/VASCULAR | Facility: HOSPITAL | Age: 57
DRG: 375 | End: 2022-01-29
Attending: PHYSICIAN ASSISTANT
Payer: COMMERCIAL

## 2022-01-29 ENCOUNTER — APPOINTMENT (OUTPATIENT)
Dept: ULTRASOUND IMAGING | Facility: HOSPITAL | Age: 57
DRG: 375 | End: 2022-01-29
Attending: INTERNAL MEDICINE
Payer: COMMERCIAL

## 2022-01-29 LAB
ALBUMIN SERPL-MCNC: 1.6 G/DL (ref 3.4–5)
ALP LIVER SERPL-CCNC: 523 U/L
ALT SERPL-CCNC: 17 U/L
ANION GAP SERPL CALC-SCNC: 5 MMOL/L (ref 0–18)
AST SERPL-CCNC: 16 U/L (ref 15–37)
BILIRUB SERPL-MCNC: 0.4 MG/DL (ref 0.1–2)
BUN BLD-MCNC: 9 MG/DL (ref 7–18)
CALCIUM BLD-MCNC: 7.6 MG/DL (ref 8.5–10.1)
CHLORIDE SERPL-SCNC: 105 MMOL/L (ref 98–112)
CO2 SERPL-SCNC: 27 MMOL/L (ref 21–32)
CREAT BLD-MCNC: 0.52 MG/DL
GLOBULIN PLAS-MCNC: 3.4 G/DL (ref 2.8–4.4)
GLUCOSE BLD-MCNC: 107 MG/DL (ref 70–99)
GLUCOSE BLD-MCNC: 110 MG/DL (ref 70–99)
GLUCOSE BLD-MCNC: 112 MG/DL (ref 70–99)
GLUCOSE BLD-MCNC: 116 MG/DL (ref 70–99)
GLUCOSE BLD-MCNC: 89 MG/DL (ref 70–99)
GLUCOSE BLD-MCNC: 99 MG/DL (ref 70–99)
HGB BLD-MCNC: 8.6 G/DL
HGB BLD-MCNC: 8.7 G/DL
HGB BLD-MCNC: 8.9 G/DL
MAGNESIUM SERPL-MCNC: 2.4 MG/DL (ref 1.6–2.6)
OSMOLALITY SERPL CALC.SUM OF ELEC: 283 MOSM/KG (ref 275–295)
PHOSPHATE SERPL-MCNC: 4.3 MG/DL (ref 2.5–4.9)
POTASSIUM SERPL-SCNC: 4.8 MMOL/L (ref 3.5–5.1)
PROT SERPL-MCNC: 5 G/DL (ref 6.4–8.2)
SODIUM SERPL-SCNC: 137 MMOL/L (ref 136–145)
VANCOMYCIN PEAK SERPL-MCNC: 36.6 UG/ML (ref 30–50)

## 2022-01-29 PROCEDURE — 99232 SBSQ HOSP IP/OBS MODERATE 35: CPT | Performed by: INTERNAL MEDICINE

## 2022-01-29 PROCEDURE — 0JPT0WZ REMOVAL OF TOTALLY IMPLANTABLE VASCULAR ACCESS DEVICE FROM TRUNK SUBCUTANEOUS TISSUE AND FASCIA, OPEN APPROACH: ICD-10-PCS | Performed by: RADIOLOGY

## 2022-01-29 PROCEDURE — 93971 EXTREMITY STUDY: CPT | Performed by: INTERNAL MEDICINE

## 2022-01-29 RX ORDER — LIDOCAINE HYDROCHLORIDE 10 MG/ML
INJECTION, SOLUTION INFILTRATION; PERINEURAL
Status: COMPLETED
Start: 2022-01-29 | End: 2022-01-29

## 2022-01-29 RX ORDER — LIDOCAINE HYDROCHLORIDE AND EPINEPHRINE 10; 10 MG/ML; UG/ML
INJECTION, SOLUTION INFILTRATION; PERINEURAL
Status: COMPLETED
Start: 2022-01-29 | End: 2022-01-29

## 2022-01-29 RX ORDER — VANCOMYCIN HYDROCHLORIDE 1 G/20ML
INJECTION, POWDER, LYOPHILIZED, FOR SOLUTION INTRAVENOUS
Status: COMPLETED
Start: 2022-01-29 | End: 2022-01-29

## 2022-01-29 RX ORDER — MIDAZOLAM HYDROCHLORIDE 1 MG/ML
INJECTION INTRAMUSCULAR; INTRAVENOUS
Status: COMPLETED
Start: 2022-01-29 | End: 2022-01-29

## 2022-01-29 NOTE — PLAN OF CARE
Assumed patient care at [address] new \"sharp\" upper back pain, lower back, and abdominal pain; relief with scheduled medications and PRN dilaudid; Hosp and ID aware  Port removed  LUE swelling noted; Dr. Ursula Gilliam notified. LUE US complete  IV antibiotics and PPN per order  Intermittently nauseous; spitting up white, frothy sputum. Some relief with scheduled  and PRN antiemetics      Plan of care discussed with patient, family, and physicians.

## 2022-01-29 NOTE — PLAN OF CARE
Assumed care. Plan for port removal today around 1pm. Pain management with percocet and dilaudid. Ostomy bag to JT site, working well. Will monitor.

## 2022-01-30 LAB
ANION GAP SERPL CALC-SCNC: 4 MMOL/L (ref 0–18)
BUN BLD-MCNC: 10 MG/DL (ref 7–18)
CALCIUM BLD-MCNC: 7.5 MG/DL (ref 8.5–10.1)
CHLORIDE SERPL-SCNC: 108 MMOL/L (ref 98–112)
CO2 SERPL-SCNC: 25 MMOL/L (ref 21–32)
CREAT BLD-MCNC: 0.57 MG/DL
CREAT BLD-MCNC: 0.72 MG/DL
GLUCOSE BLD-MCNC: 107 MG/DL (ref 70–99)
GLUCOSE BLD-MCNC: 117 MG/DL (ref 70–99)
GLUCOSE BLD-MCNC: 117 MG/DL (ref 70–99)
GLUCOSE BLD-MCNC: 118 MG/DL (ref 70–99)
GLUCOSE BLD-MCNC: 90 MG/DL (ref 70–99)
GLUCOSE BLD-MCNC: 96 MG/DL (ref 70–99)
HGB BLD-MCNC: 8.3 G/DL
HGB BLD-MCNC: 8.5 G/DL
MAGNESIUM SERPL-MCNC: 2.4 MG/DL (ref 1.6–2.6)
OSMOLALITY SERPL CALC.SUM OF ELEC: 284 MOSM/KG (ref 275–295)
PHOSPHATE SERPL-MCNC: 4.2 MG/DL (ref 2.5–4.9)
POTASSIUM SERPL-SCNC: 4.7 MMOL/L (ref 3.5–5.1)
SODIUM SERPL-SCNC: 137 MMOL/L (ref 136–145)
TRIGL SERPL-MCNC: 127 MG/DL (ref 30–149)

## 2022-01-30 PROCEDURE — 99232 SBSQ HOSP IP/OBS MODERATE 35: CPT | Performed by: INTERNAL MEDICINE

## 2022-01-30 NOTE — PLAN OF CARE
Assumed care at 0730  A&Ox4  On RA  NSR on tele  PRN dilaudid and scheduled percocet given for pain  J-tube clamped with ostomy pouch around it, changed per pt request  PPN cycled per order  Up SB assist voiding without difficulty  PRN compazine and schedule zofran given for nausea  Will continue to monitor

## 2022-01-30 NOTE — PLAN OF CARE
Assumed care at 1. No acute new issues overnight. Percocet/dilaudid/zofran managing pain and nausea. Minimal drainage from JT/ostomy bag. Ostomy site has remained dry. Left chest site with mepilex bandage. C/D/I. Discharge planning.       Problem: GASTROINTESTINAL - ADULT  Goal: Minimal or absence of nausea and vomiting  Description: INTERVENTIONS:  - Maintain adequate hydration with IV or PO as ordered and tolerated  - Nasogastric tube to low intermittent suction as ordered  - Evaluate effectiveness of ordered antiemetic medications  - Provide nonpharmacologic comfort measures as appropriate  - Advance diet as tolerated, if ordered  - Obtain nutritional consult as needed  - Evaluate fluid balance  Outcome: Progressing     Problem: PAIN - ADULT  Goal: Verbalizes/displays adequate comfort level or patient's stated pain goal  Description: INTERVENTIONS:  - Encourage pt to monitor pain and request assistance  - Assess pain using appropriate pain scale  - Administer analgesics based on type and severity of pain and evaluate response  - Implement non-pharmacological measures as appropriate and evaluate response  - Consider cultural and social influences on pain and pain management  - Manage/alleviate anxiety  - Utilize distraction and/or relaxation techniques  - Monitor for opioid side effects  - Notify MD/LIP if interventions unsuccessful or patient reports new pain  - Anticipate increased pain with activity and pre-medicate as appropriate  Outcome: Progressing     Problem: RISK FOR INFECTION - ADULT  Goal: Absence of fever/infection during anticipated neutropenic period  Description: INTERVENTIONS  - Monitor WBC  - Administer growth factors as ordered  - Implement neutropenic guidelines  Outcome: Progressing

## 2022-01-31 ENCOUNTER — APPOINTMENT (OUTPATIENT)
Dept: MRI IMAGING | Facility: HOSPITAL | Age: 57
DRG: 375 | End: 2022-01-31
Attending: PHYSICIAN ASSISTANT
Payer: COMMERCIAL

## 2022-01-31 LAB
ANION GAP SERPL CALC-SCNC: 4 MMOL/L (ref 0–18)
BUN BLD-MCNC: 12 MG/DL (ref 7–18)
CALCIUM BLD-MCNC: 7.4 MG/DL (ref 8.5–10.1)
CHLORIDE SERPL-SCNC: 109 MMOL/L (ref 98–112)
CO2 SERPL-SCNC: 25 MMOL/L (ref 21–32)
CREAT BLD-MCNC: 0.63 MG/DL
CREAT BLD-MCNC: 0.65 MG/DL
GLUCOSE BLD-MCNC: 115 MG/DL (ref 70–99)
GLUCOSE BLD-MCNC: 124 MG/DL (ref 70–99)
GLUCOSE BLD-MCNC: 139 MG/DL (ref 70–99)
GLUCOSE BLD-MCNC: 87 MG/DL (ref 70–99)
HGB BLD-MCNC: 8 G/DL
MAGNESIUM SERPL-MCNC: 2.4 MG/DL (ref 1.6–2.6)
OSMOLALITY SERPL CALC.SUM OF ELEC: 287 MOSM/KG (ref 275–295)
PHOSPHATE SERPL-MCNC: 3.8 MG/DL (ref 2.5–4.9)
POTASSIUM SERPL-SCNC: 4.7 MMOL/L (ref 3.5–5.1)
SODIUM SERPL-SCNC: 138 MMOL/L (ref 136–145)
VANCOMYCIN PEAK SERPL-MCNC: 42.5 UG/ML (ref 30–50)
VANCOMYCIN TROUGH SERPL-MCNC: 27.1 UG/ML (ref 10–20)

## 2022-01-31 PROCEDURE — 99232 SBSQ HOSP IP/OBS MODERATE 35: CPT | Performed by: INTERNAL MEDICINE

## 2022-01-31 PROCEDURE — 72148 MRI LUMBAR SPINE W/O DYE: CPT | Performed by: PHYSICIAN ASSISTANT

## 2022-01-31 RX ORDER — LORAZEPAM 2 MG/ML
0.5 INJECTION INTRAMUSCULAR ONCE
Status: COMPLETED | OUTPATIENT
Start: 2022-01-31 | End: 2022-01-31

## 2022-01-31 NOTE — PLAN OF CARE
Received pt at 1930  Pt AOx4, NSR, RA, VSS  PRN dilaudid & oxy  Scheduled Percocet & Zofran  Cyclic PPN infusing per orders. Vanco trough @ 0430 1/31  Jtube w/ ostomy bag cdi  D/c Planning: PICC placement 1/31. Dc home w/ IV abx & TPN  Call light within reach. Needs currently met      Problem: Patient/Family Goals  Goal: Patient/Family Long Term Goal  Description: Patient's Long Term Goal: Pain will be decreased to 3-4 on scale 0-10. Interventions:  - Pain medication    Relaxation techniques including meditation. Repositioning self.     Ambulating hallway  - See additional Care Plan goals for specific interventions  Outcome: Progressing  Goal: Patient/Family Short Term Goal  Description: Patient's Short Term Goal: tolerate clear liquids    Interventions:   - attempt to consume small amounts of clear liquid diet while sitting in chair    Take antiemetics as needed  - See additional Care Plan goals for specific interventions  Outcome: Progressing     Problem: GASTROINTESTINAL - ADULT  Goal: Minimal or absence of nausea and vomiting  Description: INTERVENTIONS:  - Maintain adequate hydration with IV or PO as ordered and tolerated  - Nasogastric tube to low intermittent suction as ordered  - Evaluate effectiveness of ordered antiemetic medications  - Provide nonpharmacologic comfort measures as appropriate  - Advance diet as tolerated, if ordered  - Obtain nutritional consult as needed  - Evaluate fluid balance  Outcome: Progressing     Problem: PAIN - ADULT  Goal: Verbalizes/displays adequate comfort level or patient's stated pain goal  Description: INTERVENTIONS:  - Encourage pt to monitor pain and request assistance  - Assess pain using appropriate pain scale  - Administer analgesics based on type and severity of pain and evaluate response  - Implement non-pharmacological measures as appropriate and evaluate response  - Consider cultural and social influences on pain and pain management  - Manage/alleviate anxiety  - Utilize distraction and/or relaxation techniques  - Monitor for opioid side effects  - Notify MD/LIP if interventions unsuccessful or patient reports new pain  - Anticipate increased pain with activity and pre-medicate as appropriate  Outcome: Progressing     Problem: RISK FOR INFECTION - ADULT  Goal: Absence of fever/infection during anticipated neutropenic period  Description: INTERVENTIONS  - Monitor WBC  - Administer growth factors as ordered  - Implement neutropenic guidelines  Outcome: Progressing     Problem: SAFETY ADULT - FALL  Goal: Free from fall injury  Description: INTERVENTIONS:  - Assess pt frequently for physical needs  - Identify cognitive and physical deficits and behaviors that affect risk of falls.   - [place] fall precautions as indicated by assessment.  - Educate pt/family on patient safety including physical limitations  - Instruct pt to call for assistance with activity based on assessment  - Modify environment to reduce risk of injury  - Provide assistive devices as appropriate  - Consider OT/PT consult to assist with strengthening/mobility  - Encourage toileting schedule  Outcome: Progressing     Problem: DISCHARGE PLANNING  Goal: Discharge to home or other facility with appropriate resources  Description: INTERVENTIONS:  - Identify barriers to discharge w/pt and caregiver  - Include patient/family/discharge partner in discharge planning  - Arrange for needed discharge resources and transportation as appropriate  - Identify discharge learning needs (meds, wound care, etc)  - Arrange for interpreters to assist at discharge as needed  - Consider post-discharge preferences of patient/family/discharge partner  - Complete POLST form as appropriate  - Assess patient's ability to be responsible for managing their own health  - Refer to Case Management Department for coordinating discharge planning if the patient needs post-hospital services based on physician/LIP order or complex needs related to functional status, cognitive ability or social support system  Outcome: Progressing

## 2022-01-31 NOTE — CM/SW NOTE
MSW met with the patient in her room as there has been some confusion about her discharge plan. The patient is current with:  [address]: [phone]. MSW spoke with Shikha Lira at [facility] to confirm. SW will send final orders via Aidin. [facility] informed MSW that the patient is current with TPN and had it before she came to the hospital through [facility] [phone]. MSW called and left a message for the Pharmacist Chinyere. MSW will fax updated TPN orders to [facility] at [phone]. The patient will also be discharging on IV abx. Orders are not yet written. MSW will sent to [facility] and will likely need auth. RADHA will continue to follow.     Horace Beckett LCSW

## 2022-01-31 NOTE — VASCULAR ACCESS
Consulted to place a double lumen PICC. Per telephone conversation with ULYSSES Alvarado, pt is not going home today. Will see pt in am for PICC.

## 2022-01-31 NOTE — CM/SW NOTE
DC plan:    [facility]  [address]  [facility]  Phone: [phone]  Fax: [phone]  Clinical and order faxed today. [address] [place] [address] Infusion  P) [phone]  Pharmacist Chinyere: [phone]  IEY)[phone]  Clinical, RD note, and 3 in 1 TPN order faxed today. Final orders for IV Vanco faxed. Awaiting confirmation. Please call companies above to inform of dc date and confirm delivery.     Az Sharp LCSW

## 2022-01-31 NOTE — PLAN OF CARE
Assumed care @0730  VSS,   A&Ox4, RA  NSR ,   Pain controlled with arya percocet, prn oxy and dilaudid,   Up with standby assist as tolerated. No PO taken today. Tolerating TPN diet. Intake and outputs w/d/l. No BM    J tube inside ostomy bag with green drainage. PT/OT recommend home health  PICC to be placed tomorrow. Updated POC with patient and family. Will continue to monitor. Problem: Patient/Family Goals  Goal: Patient/Family Long Term Goal  Description: Patient's Long Term Goal: Pain will be decreased to 3-4 on scale 0-10. Interventions:  - Pain medication    Relaxation techniques including meditation. Repositioning self.     Ambulating hallway  - See additional Care Plan goals for specific interventions  Outcome: Progressing  Goal: Patient/Family Short Term Goal  Description: Patient's Short Term Goal: tolerate clear liquids    Interventions:   - attempt to consume small amounts of clear liquid diet while sitting in chair    Take antiemetics as needed  - See additional Care Plan goals for specific interventions  Outcome: Progressing     Problem: GASTROINTESTINAL - ADULT  Goal: Minimal or absence of nausea and vomiting  Description: INTERVENTIONS:  - Maintain adequate hydration with IV or PO as ordered and tolerated  - Nasogastric tube to low intermittent suction as ordered  - Evaluate effectiveness of ordered antiemetic medications  - Provide nonpharmacologic comfort measures as appropriate  - Advance diet as tolerated, if ordered  - Obtain nutritional consult as needed  - Evaluate fluid balance  Outcome: Progressing     Problem: PAIN - ADULT  Goal: Verbalizes/displays adequate comfort level or patient's stated pain goal  Description: INTERVENTIONS:  - Encourage pt to monitor pain and request assistance  - Assess pain using appropriate pain scale  - Administer analgesics based on type and severity of pain and evaluate response  - Implement non-pharmacological measures as appropriate and evaluate response  - Consider cultural and social influences on pain and pain management  - Manage/alleviate anxiety  - Utilize distraction and/or relaxation techniques  - Monitor for opioid side effects  - Notify MD/LIP if interventions unsuccessful or patient reports new pain  - Anticipate increased pain with activity and pre-medicate as appropriate  Outcome: Progressing     Problem: RISK FOR INFECTION - ADULT  Goal: Absence of fever/infection during anticipated neutropenic period  Description: INTERVENTIONS  - Monitor WBC  - Administer growth factors as ordered  - Implement neutropenic guidelines  Outcome: Progressing     Problem: SAFETY ADULT - FALL  Goal: Free from fall injury  Description: INTERVENTIONS:  - Assess pt frequently for physical needs  - Identify cognitive and physical deficits and behaviors that affect risk of falls.   - [place] fall precautions as indicated by assessment.  - Educate pt/family on patient safety including physical limitations  - Instruct pt to call for assistance with activity based on assessment  - Modify environment to reduce risk of injury  - Provide assistive devices as appropriate  - Consider OT/PT consult to assist with strengthening/mobility  - Encourage toileting schedule  Outcome: Progressing     Problem: DISCHARGE PLANNING  Goal: Discharge to home or other facility with appropriate resources  Description: INTERVENTIONS:  - Identify barriers to discharge w/pt and caregiver  - Include patient/family/discharge partner in discharge planning  - Arrange for needed discharge resources and transportation as appropriate  - Identify discharge learning needs (meds, wound care, etc)  - Arrange for interpreters to assist at discharge as needed  - Consider post-discharge preferences of patient/family/discharge partner  - Complete POLST form as appropriate  - Assess patient's ability to be responsible for managing their own health  - Refer to Case Management Department for coordinating discharge planning if the patient needs post-hospital services based on physician/LIP order or complex needs related to functional status, cognitive ability or social support system  Outcome: Progressing

## 2022-01-31 NOTE — CM/SW NOTE
[facility]- [address] [facility], [address] [place] [address]  Phone: [phone]  Fax: [phone]    Has been reserved for home TPN and IV abx. Awaiting final orders. MSW sent [place] [address] referrals today via Aidin. She is not current with any [place] [address] agency thus far. Awaiting responses from multiple agencies referred.     CONSTANTINO BarryW

## 2022-02-01 VITALS
WEIGHT: 121.5 LBS | BODY MASS INDEX: 20.74 KG/M2 | DIASTOLIC BLOOD PRESSURE: 68 MMHG | TEMPERATURE: 98 F | HEIGHT: 64 IN | HEART RATE: 87 BPM | SYSTOLIC BLOOD PRESSURE: 128 MMHG | RESPIRATION RATE: 18 BRPM | OXYGEN SATURATION: 90 %

## 2022-02-01 LAB
CREAT BLD-MCNC: 0.64 MG/DL
GLUCOSE BLD-MCNC: 85 MG/DL (ref 70–99)
HGB BLD-MCNC: 8.2 G/DL
PLATELET # BLD AUTO: 148 10(3)UL (ref 150–450)

## 2022-02-01 PROCEDURE — B548ZZA ULTRASONOGRAPHY OF SUPERIOR VENA CAVA, GUIDANCE: ICD-10-PCS | Performed by: INTERNAL MEDICINE

## 2022-02-01 PROCEDURE — 02HV33Z INSERTION OF INFUSION DEVICE INTO SUPERIOR VENA CAVA, PERCUTANEOUS APPROACH: ICD-10-PCS | Performed by: INTERNAL MEDICINE

## 2022-02-01 PROCEDURE — 99239 HOSP IP/OBS DSCHRG MGMT >30: CPT | Performed by: INTERNAL MEDICINE

## 2022-02-01 RX ORDER — OXYCODONE HYDROCHLORIDE AND ACETAMINOPHEN 5; 325 MG/1; MG/1
1 TABLET ORAL EVERY 6 HOURS
Qty: 120 TABLET | Refills: 0 | Status: SHIPPED | OUTPATIENT
Start: 2022-02-01

## 2022-02-01 RX ORDER — LIDOCAINE HYDROCHLORIDE 10 MG/ML
5 INJECTION, SOLUTION EPIDURAL; INFILTRATION; INTRACAUDAL; PERINEURAL
Status: COMPLETED | OUTPATIENT
Start: 2022-02-01 | End: 2022-02-01

## 2022-02-01 RX ORDER — DEXTROSE AND SODIUM CHLORIDE 5; .45 G/100ML; G/100ML
INJECTION, SOLUTION INTRAVENOUS CONTINUOUS
Status: DISCONTINUED | OUTPATIENT
Start: 2022-02-01 | End: 2022-02-01

## 2022-02-01 RX ORDER — OXYCODONE HCL 5 MG/5 ML
10 SOLUTION, ORAL ORAL EVERY 4 HOURS PRN
Qty: 250 ML | Refills: 0 | Status: SHIPPED | OUTPATIENT
Start: 2022-02-01

## 2022-02-01 NOTE — CM/SW NOTE
02/01/22 0900   Discharge disposition   Expected discharge disposition [address] Provider   ([facility])   Additional Home Care/Hospice Provider   ([address] [facility])     SW spoke to pt about dc plans. Pt to dc home today after PICC line placed. [facility] notified of need for start of care tomorrow. [address] Infusion will deliver TPN and IV AB supplies to home today.     Deepak Chau LCSW  /Discharge Planner  [phone]

## 2022-02-01 NOTE — PLAN OF CARE
Assumed care at 1930  VSS- NSR on tele, RA  Pain moderately controlled with scheduled percocet, PRN oxy- pt reports she does not tolerate dilaudid  MRI completed  No PO intake overnight- unable to administer TPN d/t no central line, Hosp notified, D5.45NS started per order  Adequate UO overnight, up to bathroom with standby assist  J tube inside ostomy bag with green/bile drainage  PT/OT rec home w/ [address] for PICC to be placed today  Pt resting comfortably  POC discussed with pt

## 2022-02-01 NOTE — CM/SW NOTE
Spoke to [address] [facility]- they will reach out to pt to set up home delivery. Pt will need to get her IV vanco here before she leaves. Will notify [facility] of plans for dc today. Will need to send AVS to both agencies.     Tennille Branch LCSW  /Discharge Planner

## 2022-02-01 NOTE — PLAN OF CARE
Assumed patient care at 0730/ VSS   Pain controlled with percocet   PICC placed this am   Ostomy bag replaced and extra supplies provided   Discussed home infusions and HH with SW- all complete and ready for [address] for DC from all services   Peripheral Ivs removed, tele removed   DC medication and follow up reviewed with patient.  Patients friend and PCP updated   Discharged with all belongings in wheelchair to private car

## 2022-02-02 NOTE — PAYOR COMM NOTE
--------------  DISCHARGE REVIEW    Payor: [address] #:  [id number]  Authorization Number: [id number]    Admit date: 1/20/22  Admit time:   2:13 PM  Discharge Date: 2/1/2022  2:27 PM     Admitting Physician: Henry Kemp MD  Attending Physician:  No att. providers found  Primary Care Physician: Tomy Freire MD

## 2024-05-12 NOTE — PLAN OF CARE
Assumed patient care at 0730  Nauseous this am; relief with PRN zofran  J-tube dressing changed per wound care; no drainage noted  Heparin gtt discontinued; first dose lovenox given  Son at bedside        NURSING DISCHARGE NOTE    Discharged Home via [facility]

## (undated) DEVICE — SUTURE PROLENE 3-0 SH

## (undated) DEVICE — BULB SYRINGE,IRRIGATION WITH PROTECTIVE CAP: Brand: DOVER

## (undated) DEVICE — DRAPE EQUIPMENT INTRATEMP THER

## (undated) DEVICE — KENDALL SCD EXPRESS SLEEVES, KNEE LENGTH, MEDIUM: Brand: KENDALL SCD

## (undated) DEVICE — SUTURE VICRYL 2-0 SH

## (undated) DEVICE — TOWEL OR BLU 16X26 STRL

## (undated) DEVICE — GUIDEWIRE STIFF SHAFT .035/260

## (undated) DEVICE — REM POLYHESIVE ADULT PATIENT RETURN ELECTRODE: Brand: VALLEYLAB

## (undated) DEVICE — RETRIEVAL BALLOON CATHETER: Brand: EXTRACTOR™ PRO RX

## (undated) DEVICE — FILTERLINE NASAL ADULT O2/CO2

## (undated) DEVICE — RETRIEVAL BALLOON CATHETER: Brand: EXTRACTOR™ PRO XL

## (undated) DEVICE — DRAPE CASSETTE X-RAY

## (undated) DEVICE — GOWN,PREVENTION PLUS,XLARGE,STERILE: Brand: MEDLINE

## (undated) DEVICE — SOL  .9 1000ML BTL

## (undated) DEVICE — SUTURE SILK 2-0 SH

## (undated) DEVICE — GUIDEWIRE DREAM STR .035 450

## (undated) DEVICE — Device: Brand: DEFENDO AIR/WATER/SUCTION AND BIOPSY VALVE

## (undated) DEVICE — DEVICE SPCMN RTRVL RAPTOR MINI

## (undated) DEVICE — STERILE POLYISOPRENE POWDER-FREE SURGICAL GLOVES: Brand: PROTEXIS

## (undated) DEVICE — 3M™ RED DOT™ MONITORING ELECTRODE WITH FOAM TAPE AND STICKY GEL, 50/BAG, 20/CASE, 72/PLT 2570: Brand: RED DOT™

## (undated) DEVICE — 40580 - THE PINK PAD - ADVANCED TRENDELENBURG POSITIONING KIT: Brand: 40580 - THE PINK PAD - ADVANCED TRENDELENBURG POSITIONING KIT

## (undated) DEVICE — SUTURE SILK 0 FSL

## (undated) DEVICE — GOWN,PREVENTION PLUS,LARGE,STERILE: Brand: MEDLINE

## (undated) DEVICE — PAD SACRAL SPAN AID

## (undated) DEVICE — SUTURE PROLENE 2-0 MH

## (undated) DEVICE — SUTURE VICRYL 3-0 SH

## (undated) DEVICE — DRAIN RELIAVAC 100CC

## (undated) DEVICE — HARMONIC ACE +7 SHEARS ADVANCED HEMOSTASIS 5MM DIAMETER 23CM SHAFT LENGTH  FOR USE WITH GRAY HAND PIECE ONLY: Brand: HARMONIC ACE

## (undated) DEVICE — LAPAROTOMY SPONGE - RF AND X-RAY DETECTABLE PRE-WASHED: Brand: SITUATE

## (undated) DEVICE — 1200CC GUARDIAN II: Brand: GUARDIAN

## (undated) DEVICE — SUTURE PROLENE 4-0 RB-1

## (undated) DEVICE — JAGWIRE STRGHT TIP .025X450CM

## (undated) DEVICE — CHEMOTHERAPY CONTAINER,SLIDE LID, YELLOW: Brand: SHARPSAFETY

## (undated) DEVICE — COVER,MAYO STAND,STERILE: Brand: MEDLINE

## (undated) DEVICE — SUTURE SILK 3-0 SH

## (undated) DEVICE — ENDOSCOPY PACK UPPER: Brand: MEDLINE INDUSTRIES, INC.

## (undated) DEVICE — ABSORBABLE HEMOSTAT (OXIDIZED REGENERATED CELLULOSE, U.S.P.): Brand: SURGICEL

## (undated) DEVICE — NEEDLE ELECTRODE: Brand: EDGE

## (undated) DEVICE — HYDRA JAGWIRE

## (undated) DEVICE — STERILE SYNTHETIC POLYISOPRENE POWDER-FREE SURGICAL GLOVES WITH HYDROGEL COATING, SMOOTH FINISH, STRAIGHT FINGER: Brand: PROTEXIS

## (undated) DEVICE — BLANKET HYPOTHERMIA ADULT

## (undated) DEVICE — OMNIPAQUE 300 POLYB 50ML

## (undated) DEVICE — MEDI-VAC NON-CONDUCTIVE SUCTION TUBING: Brand: CARDINAL HEALTH

## (undated) DEVICE — DRAIN ROUND HUBLESS 15FR

## (undated) DEVICE — GOWN,SIRUS,FABRIC-REINFORCED,X-LARGE: Brand: MEDLINE

## (undated) DEVICE — INTENDED TO BE USED TO OCCLUDE, RETRACT AND IDENTIFY ARTERIES, VEINS, TENDONS AND NERVES IN SURGICAL PROCEDURES: Brand: STERION®  VESSEL LOOP

## (undated) DEVICE — SUTURE SILK 0

## (undated) DEVICE — LIGHT HANDLE

## (undated) DEVICE — HIPEC PACK: Brand: MEDLINE INDUSTRIES, INC.

## (undated) DEVICE — FLOTRAC SENSOR (84" / 213CM): Brand: FLOTRAC

## (undated) DEVICE — SUTURE PDS II 1 TP-1

## (undated) DEVICE — POOLE SUCTION HANDLE: Brand: CARDINAL HEALTH

## (undated) DEVICE — HEMOCLIP HORIZON MED 002200

## (undated) DEVICE — PROCEDURE KIT FOR HT-2000

## (undated) NOTE — LETTER
[address]  [address] [facility] [address], [address] [facility]  Authorization for Surgical Operation and Procedure    Date:_____2/13/20______                                                                                                         Time:___170 4.   Should the need arise during my operation or immediate post-operative period, I also consent to the administration of blood and/or blood products.   Further, I understand that despite careful testing and screening of blood or blood products by oziel 8.   I recognize that in the event my procedure results in extended X-Ray/fluoroscopy time, I may develop a skin reaction. 9.  If I have a Do Not Attempt Resuscitation (DNAR) order in place, that status will be suspended while in the operating room, proc 1. IDonna agree to be cared for by an anesthesiologist, who is specially trained to monitor me and give me medicine to put me to sleep or keep me comfortable during my procedure    I understand that my anesthesiologist is not an employee 5. My doctor has explained to me other choices available to me for my care (alternatives).   6. Pregnant Patients (“epidural”):  I understand that the risks of having an epidural (medicine given into my back to help control pain during labor), include itchi

## (undated) NOTE — LETTER
[facility] [address], [address]  Consent for Procedure/Sedation    Date: 11/16/21   Time:       1.  I authorize the performance upon Valeria Cuadra the following: Esophagogastroduodenoscopy with possible enteral stenting, ___________________    Printed: 2021   9:09 AM  Patient Name: Taym Rand        : 3/5/1965       Medical Record #: [id number]

## (undated) NOTE — LETTER
[address]  [address] [place] [address]  Consent for Procedure/Sedation    Date: 11/18/21    Time:       1. I authorize the performance upon Lia Bence the following:  Port Check and Jejunostomy Tube Exchange    2.  Rolando Beth Darryle Grimmer        : 3/5/1965       Medical Record #: [id number]

## (undated) NOTE — LETTER
Clare [address]send [address]  [address] [facility] [address], [address] [facility]  Authorization for Surgical Operation and Procedure     Date: November 16, 2021                                                                                                       Time:_______ blood products. The following are some, but not all, of the potential risks that can occur: fever and allergic reactions, hemolytic reactions, transmission of diseases such as Hepatitis, AIDS and Cytomegalovirus (CMV) and fluid overload.   In the event mavis consent on my behalf). The surgeon or my attending physician will determine when the applicable recovery period ends for purposes of reinstating the DNAR order.   10. Patients having a sterilization procedure: I understand that if the procedure is successfu for anesthesia services, I agree to:  a. Allow the anesthesiologist (anesthesia doctor) to give me medicine and do additional procedures as necessary.  Some examples are: Starting or using an “IV” to give me medicine, fluids or blood during my procedure, an injury. 7. Regional Anesthesia (“spinal”, “epidural”, & “nerve blocks”): I understand that rare but potential complications include headache, bleeding, infection, seizure, irregular heart rhythms, and nerve injury.     I can change my mind about having an

## (undated) NOTE — LETTER
Clare Townsend [address]  [address] [facility] [address], [address] [facility]  Authorization for Surgical Operation and Procedure     Date:___________                                                                                                         Time:__________ 4.   Should the need arise during my operation or immediate post-operative period, I also consent to the administration of blood and/or blood products.   Further, I understand that despite careful testing and screening of blood or blood products by oziel 8.   I recognize that in the event my procedure results in extended X-Ray/fluoroscopy time, I may develop a skin reaction. 9.  If I have a Do Not Attempt Resuscitation (DNAR) order in place, that status will be suspended while in the operating room, proc 1. I, Marston Carrel agree to be cared for by an anesthesiologist, who is specially trained to monitor me and give me medicine to put me to sleep or keep me comfortable during my procedure    I understand that my anesthesiologist is not an employee 5. My doctor has explained to me other choices available to me for my care (alternatives).   6. Pregnant Patients (“epidural”):  I understand that the risks of having an epidural (medicine given into my back to help control pain during labor), include itchi

## (undated) NOTE — LETTER
[facility] Testing Department  Phone: [phone]  Right Fax: [phone]  [facility] By:  Clemente Ch RN Date: 1/29/21    Patient Name: Erwin Damico  Surgery Date: 2/12/2021    CSN: [id number]  Medical Record: [id number]

## (undated) NOTE — LETTER
Clare Townsend [address]  [address] [facility] [address], [address] [facility]  Authorization for Surgical Operation and Procedure     Date:___________                                                                                                         Time:__________ and/or blood products. The following are some, but not all, of the potential risks that can occur: fever and allergic reactions, hemolytic reactions, transmission of diseases such as Hepatitis, AIDS and Cytomegalovirus (CMV) and fluid overload.   In the ev authorized to consent on my behalf). The surgeon or my attending physician will determine when the applicable recovery period ends for purposes of reinstating the DNAR order.   10. Patients having a sterilization procedure: I understand that if the procedur patient asking for anesthesia services, I agree to:  a. Allow the anesthesiologist (anesthesia doctor) to give me medicine and do additional procedures as necessary.  Some examples are: Starting or using an “IV” to give me medicine, fluids or blood during m rhythms and nerve injury. 7. Regional Anesthesia (“spinal”, “epidural”, & “nerve blocks”): I understand that rare but potential complications include headache, bleeding, infection, seizure, irregular heart rhythms, and nerve injury.     I can change my mi

## (undated) NOTE — LETTER
Clare Townsend 182  [address] [facility] [address], [address] [facility]  Authorization for Surgical Operation and Procedure     Date:___________                                                                                                         Time:__________ some, but not all, of the potential risks that can occur: fever and allergic reactions, hemolytic reactions, transmission of diseases such as Hepatitis, AIDS and Cytomegalovirus (CMV) and fluid overload.   In the event that I wish to have an autologous bell or my attending physician will determine when the applicable recovery period ends for purposes of reinstating the DNAR order.   10. Patients having a sterilization procedure: I understand that if the procedure is successful the results will be permanent and to: a. Allow the anesthesiologist (anesthesia doctor) to give me medicine and do additional procedures as necessary.  Some examples are: Starting or using an “IV” to give me medicine, fluids or blood during my procedure, and having a breathing tube placed (“spinal”, “epidural”, & “nerve blocks”): I understand that rare but potential complications include headache, bleeding, infection, seizure, irregular heart rhythms, and nerve injury.     I can change my mind about having anesthesia services at any time be

## (undated) NOTE — LETTER
[facility]  [place], [address] [facility] [place] [address]  [facility]: [phone]  FAX: [phone], [phone]  Via Fax: [phone]    The

## (undated) NOTE — LETTER
Clare Townsend 182  [address] [facility] [address], [address] [facility]  Authorization for Surgical Operation and Procedure     Date:___________                                                                                                         Time:__________ reactions, hemolytic reactions, transmission of diseases such as Hepatitis, AIDS and Cytomegalovirus (CMV) and fluid overload. In the event that I wish to have an autologous transfusion of my own blood, or a directed donor transfusion.   I will discuss thi ends for purposes of reinstating the DNAR order.   10. Patients having a sterilization procedure: I understand that if the procedure is successful the results will be permanent and it will therefore be impossible for me to inseminate, conceive, or bear chil do additional procedures as necessary. Some examples are: Starting or using an “IV” to give me medicine, fluids or blood during my procedure, and having a breathing tube placed to help me breathe when I’m asleep (intubation).  In the event that my heart sto complications include headache, bleeding, infection, seizure, irregular heart rhythms, and nerve injury.     I can change my mind about having anesthesia services at any time before I get the medicine.    ____________________________________________________

## (undated) NOTE — LETTER
[facility] Testing Department  Phone: [phone]  OUTSIDE TESTING RESULT REQUEST      TO:   Dr. Stephanie Hoover and staff Today's Date: 1/29/21    FAX #: [phone]     IMPORTANT: FOR YOUR IMMEDIATE ATTENTION    Please FAX all test results listed